# Patient Record
Sex: MALE | Race: WHITE | NOT HISPANIC OR LATINO | Employment: FULL TIME | URBAN - METROPOLITAN AREA
[De-identification: names, ages, dates, MRNs, and addresses within clinical notes are randomized per-mention and may not be internally consistent; named-entity substitution may affect disease eponyms.]

---

## 2020-11-16 ENCOUNTER — OFFICE VISIT (OUTPATIENT)
Dept: FAMILY MEDICINE CLINIC | Facility: CLINIC | Age: 30
End: 2020-11-16

## 2020-11-16 VITALS
HEIGHT: 74 IN | RESPIRATION RATE: 16 BRPM | BODY MASS INDEX: 37.99 KG/M2 | SYSTOLIC BLOOD PRESSURE: 127 MMHG | TEMPERATURE: 96.9 F | HEART RATE: 70 BPM | OXYGEN SATURATION: 99 % | DIASTOLIC BLOOD PRESSURE: 82 MMHG | WEIGHT: 296 LBS

## 2020-11-16 DIAGNOSIS — Z00.00 ANNUAL PHYSICAL EXAM: Primary | ICD-10-CM

## 2020-11-16 DIAGNOSIS — E03.9 ACQUIRED HYPOTHYROIDISM: ICD-10-CM

## 2020-11-16 DIAGNOSIS — J45.990 MILD EXERCISE-INDUCED ASTHMA: ICD-10-CM

## 2020-11-16 DIAGNOSIS — J30.1 SEASONAL ALLERGIC RHINITIS DUE TO POLLEN: ICD-10-CM

## 2020-11-16 PROBLEM — E66.9 OBESITY (BMI 35.0-39.9 WITHOUT COMORBIDITY): Status: ACTIVE | Noted: 2019-06-04

## 2020-11-16 PROBLEM — E78.1 HYPERTRIGLYCERIDEMIA: Status: ACTIVE | Noted: 2019-06-04

## 2020-11-16 PROCEDURE — 99395 PREV VISIT EST AGE 18-39: CPT | Performed by: FAMILY MEDICINE

## 2020-11-16 RX ORDER — LEVOTHYROXINE SODIUM 0.1 MG/1
100 TABLET ORAL DAILY
Qty: 90 TABLET | Refills: 1 | Status: SHIPPED | OUTPATIENT
Start: 2020-11-16 | End: 2021-07-27 | Stop reason: SDUPTHER

## 2020-11-16 RX ORDER — ALBUTEROL SULFATE 90 UG/1
2 AEROSOL, METERED RESPIRATORY (INHALATION) EVERY 4 HOURS PRN
COMMUNITY

## 2020-11-16 RX ORDER — LEVOTHYROXINE SODIUM 0.1 MG/1
100 TABLET ORAL DAILY
COMMUNITY
Start: 2020-08-13 | End: 2020-11-16 | Stop reason: SDUPTHER

## 2020-11-16 RX ORDER — FLUTICASONE PROPIONATE 50 MCG
2 SPRAY, SUSPENSION (ML) NASAL DAILY
Qty: 15.8 ML | Refills: 11 | Status: SHIPPED | OUTPATIENT
Start: 2020-11-16

## 2020-11-16 RX ORDER — FLUTICASONE PROPIONATE 50 MCG
2 SPRAY, SUSPENSION (ML) NASAL DAILY
COMMUNITY
End: 2020-11-16 | Stop reason: SDUPTHER

## 2020-11-16 NOTE — PROGRESS NOTES
Subjective   León Martinez is a 30 y.o. male.     History of Present Illness     30-year-old male who is a new patient to me presents today for annual physical exam.    Hypothyroidism: Levothyroxine 100 mcg a day.  Due for labs today. Has been on this med since college.     Allergic Rhinitis: Getting allergy shots; can lead to allergy induced asthma; uses Albuterol inhaler maybe a couple of times every fall or spring    Flu vaccine: Up-to-date  Tdap: Up-to-date    Diet/exercise: Three days a week working out     Social history: Social alcohol use no drug use former rare social smoker during college    Sexual history: Sexually active with wife.  No concern for STD testing today.    Wears contact lenses; vision checked once a year.    Family history of brain cancer in grandfather. No colon cancer/breast cancer.     PHQ-2/PHQ-9 Depression Screening 11/16/2020   Little interest or pleasure in doing things 0   Feeling down, depressed, or hopeless 0   Total Score 0       The following portions of the patient's history were reviewed and updated as appropriate: allergies, current medications, past family history, past medical history, past social history, past surgical history and problem list.    Review of Systems   Constitutional: Negative for activity change, appetite change, chills and fever.   HENT: Negative for congestion, ear pain, sinus pressure and sore throat.    Respiratory: Negative for cough and shortness of breath.    Cardiovascular: Negative for chest pain.   Gastrointestinal: Negative for abdominal pain, blood in stool, constipation and diarrhea.   Musculoskeletal: Negative for back pain.   Psychiatric/Behavioral: Negative for stress. The patient is not nervous/anxious.        Objective   Physical Exam  Constitutional:       Appearance: He is well-developed.   HENT:      Head: Normocephalic and atraumatic.      Right Ear: External ear normal.      Left Ear: External ear normal.      Nose: Nose normal.   Eyes:       Conjunctiva/sclera: Conjunctivae normal.      Pupils: Pupils are equal, round, and reactive to light.   Neck:      Musculoskeletal: Normal range of motion and neck supple.   Cardiovascular:      Rate and Rhythm: Normal rate and regular rhythm.      Heart sounds: No murmur.   Pulmonary:      Effort: Pulmonary effort is normal. No respiratory distress.      Breath sounds: Normal breath sounds. No stridor. No wheezing or rales.   Chest:      Chest wall: No tenderness.   Abdominal:      General: Bowel sounds are normal. There is no distension.      Palpations: Abdomen is soft.      Tenderness: There is no abdominal tenderness.   Musculoskeletal: Normal range of motion.   Skin:     General: Skin is warm.   Neurological:      Mental Status: He is alert and oriented to person, place, and time.   Psychiatric:         Behavior: Behavior normal.                 Assessment/Plan     Diagnoses and all orders for this visit:    1. Annual physical exam (Primary)  -     CBC & Differential  -     Comprehensive Metabolic Panel  -     Hemoglobin A1c  -     Lipid Panel  -     TSH Rfx On Abnormal To Free T4  -     Hepatitis C Antibody    2. Acquired hypothyroidism  -     levothyroxine (SYNTHROID, LEVOTHROID) 100 MCG tablet; Take 1 tablet by mouth Daily.  Dispense: 90 tablet; Refill: 1    3. Mild exercise-induced asthma    4. Seasonal allergic rhinitis due to pollen  -     fluticasone (FLONASE) 50 MCG/ACT nasal spray; 2 sprays into the nostril(s) as directed by provider Daily.  Dispense: 15.8 mL; Refill: 11      Low glycemic index diet  Exercise 30 minutes most days of the week  Make sure you get results on any labs or tests we ordered today  We discussed medications and how to take them as prescribed  Sleep 6-8 hours each night if possible  If you have not signed up for [employer]URN[employer], please activate your code ASAP from your After Visit Summary today    LDL goal <100  LDL goal if heart disease <70  HDL goal >60  Triglyceride goal  <150  BP goal =<130/80  Fasting glucose <100

## 2020-11-18 LAB
ALBUMIN SERPL-MCNC: 4.9 G/DL (ref 4.1–5.2)
ALBUMIN/GLOB SERPL: 2 {RATIO} (ref 1.2–2.2)
ALP SERPL-CCNC: 42 IU/L (ref 39–117)
ALT SERPL-CCNC: 28 IU/L (ref 0–44)
AST SERPL-CCNC: 23 IU/L (ref 0–40)
BASOPHILS # BLD AUTO: 0 X10E3/UL (ref 0–0.2)
BASOPHILS NFR BLD AUTO: 1 %
BILIRUB SERPL-MCNC: 0.5 MG/DL (ref 0–1.2)
BUN SERPL-MCNC: 14 MG/DL (ref 6–20)
BUN/CREAT SERPL: 14 (ref 9–20)
CALCIUM SERPL-MCNC: 9.5 MG/DL (ref 8.7–10.2)
CHLORIDE SERPL-SCNC: 102 MMOL/L (ref 96–106)
CHOLEST SERPL-MCNC: 175 MG/DL (ref 100–199)
CO2 SERPL-SCNC: 22 MMOL/L (ref 20–29)
CREAT SERPL-MCNC: 1.03 MG/DL (ref 0.76–1.27)
EOSINOPHIL # BLD AUTO: 0.1 X10E3/UL (ref 0–0.4)
EOSINOPHIL NFR BLD AUTO: 2 %
ERYTHROCYTE [DISTWIDTH] IN BLOOD BY AUTOMATED COUNT: 12.7 % (ref 11.6–15.4)
GLOBULIN SER CALC-MCNC: 2.5 G/DL (ref 1.5–4.5)
GLUCOSE SERPL-MCNC: 92 MG/DL (ref 65–99)
HBA1C MFR BLD: 5.2 % (ref 4.8–5.6)
HCT VFR BLD AUTO: 45.6 % (ref 37.5–51)
HCV AB S/CO SERPL IA: <0.1 S/CO RATIO (ref 0–0.9)
HDLC SERPL-MCNC: 33 MG/DL
HGB BLD-MCNC: 15.9 G/DL (ref 13–17.7)
IMM GRANULOCYTES # BLD AUTO: 0 X10E3/UL (ref 0–0.1)
IMM GRANULOCYTES NFR BLD AUTO: 1 %
LDLC SERPL CALC-MCNC: 79 MG/DL (ref 0–99)
LYMPHOCYTES # BLD AUTO: 1.6 X10E3/UL (ref 0.7–3.1)
LYMPHOCYTES NFR BLD AUTO: 33 %
MCH RBC QN AUTO: 31.1 PG (ref 26.6–33)
MCHC RBC AUTO-ENTMCNC: 34.9 G/DL (ref 31.5–35.7)
MCV RBC AUTO: 89 FL (ref 79–97)
MONOCYTES # BLD AUTO: 0.4 X10E3/UL (ref 0.1–0.9)
MONOCYTES NFR BLD AUTO: 9 %
NEUTROPHILS # BLD AUTO: 2.6 X10E3/UL (ref 1.4–7)
NEUTROPHILS NFR BLD AUTO: 54 %
PLATELET # BLD AUTO: 159 X10E3/UL (ref 150–450)
POTASSIUM SERPL-SCNC: 4.5 MMOL/L (ref 3.5–5.2)
PROT SERPL-MCNC: 7.4 G/DL (ref 6–8.5)
RBC # BLD AUTO: 5.12 X10E6/UL (ref 4.14–5.8)
SODIUM SERPL-SCNC: 140 MMOL/L (ref 134–144)
TRIGL SERPL-MCNC: 390 MG/DL (ref 0–149)
TSH SERPL DL<=0.005 MIU/L-ACNC: 0.99 UIU/ML (ref 0.45–4.5)
VLDLC SERPL CALC-MCNC: 63 MG/DL (ref 5–40)
WBC # BLD AUTO: 4.8 X10E3/UL (ref 3.4–10.8)

## 2020-11-20 NOTE — PROGRESS NOTES
All labs essentially normal except for elevations in triglycerides.  HDL could also be higher.  Exercise will help with this.  Work on diet and exercise to help with both levels.

## 2021-04-16 ENCOUNTER — BULK ORDERING (OUTPATIENT)
Dept: CASE MANAGEMENT | Facility: OTHER | Age: 31
End: 2021-04-16

## 2021-04-16 DIAGNOSIS — Z23 IMMUNIZATION DUE: ICD-10-CM

## 2021-07-27 ENCOUNTER — OFFICE VISIT (OUTPATIENT)
Dept: FAMILY MEDICINE CLINIC | Facility: CLINIC | Age: 31
End: 2021-07-27

## 2021-07-27 VITALS
OXYGEN SATURATION: 98 % | WEIGHT: 300 LBS | TEMPERATURE: 97.1 F | DIASTOLIC BLOOD PRESSURE: 84 MMHG | HEART RATE: 74 BPM | RESPIRATION RATE: 18 BRPM | HEIGHT: 74 IN | BODY MASS INDEX: 38.5 KG/M2 | SYSTOLIC BLOOD PRESSURE: 133 MMHG

## 2021-07-27 DIAGNOSIS — G56.02 CARPAL TUNNEL SYNDROME OF LEFT WRIST: Primary | ICD-10-CM

## 2021-07-27 DIAGNOSIS — L30.9 DERMATITIS: ICD-10-CM

## 2021-07-27 DIAGNOSIS — E03.9 ACQUIRED HYPOTHYROIDISM: ICD-10-CM

## 2021-07-27 PROCEDURE — 99214 OFFICE O/P EST MOD 30 MIN: CPT | Performed by: FAMILY MEDICINE

## 2021-07-27 RX ORDER — TRIAMCINOLONE ACETONIDE 1 MG/G
CREAM TOPICAL 2 TIMES DAILY
Qty: 15 G | Refills: 0 | Status: SHIPPED | OUTPATIENT
Start: 2021-07-27

## 2021-07-27 RX ORDER — LEVOTHYROXINE SODIUM 0.1 MG/1
100 TABLET ORAL DAILY
Qty: 90 TABLET | Refills: 1 | Status: SHIPPED | OUTPATIENT
Start: 2021-07-27 | End: 2021-12-27 | Stop reason: SDUPTHER

## 2021-07-27 NOTE — PROGRESS NOTES
Subjective   León Martinez is a 31 y.o. male.     History of Present Illness     31-year-old male presents today for 6-month follow-up on hypothyroidism.    Levothyroxine 100 mcg a day.  Feels like controlling symptoms well.  No unexplained fatigue no dry skin or dry hair.    Dry rash left lower extremity several years.  Working on moisturizing more.    Splinter right thumb times a few days; no signs of infection; slightly tender to palpation; working with hot compresses.    Numbness and tingling left fourth and fifth digit; family history of carpal tunnel syndrome; no shooting pain down his arm.  No history of arthritic changes in neck.  Was on vacation for a few days away from work not on computer and symptoms were not bothering him.    The following portions of the patient's history were reviewed and updated as appropriate: allergies, current medications, past family history, past medical history, past social history, past surgical history and problem list.    Review of Systems   Constitutional: Negative for chills and fever.   Respiratory: Negative for cough and shortness of breath.    Cardiovascular: Negative for chest pain, palpitations and leg swelling.   Gastrointestinal: Negative for abdominal pain, nausea and vomiting.       Objective   Physical Exam  Constitutional:       Appearance: He is well-developed.   HENT:      Head: Normocephalic and atraumatic.   Eyes:      Pupils: Pupils are equal, round, and reactive to light.   Cardiovascular:      Rate and Rhythm: Normal rate and regular rhythm.      Heart sounds: Normal heart sounds. No murmur heard.     Pulmonary:      Effort: Pulmonary effort is normal. No respiratory distress.      Breath sounds: Normal breath sounds. No stridor. No wheezing or rales.   Musculoskeletal:      Cervical back: Normal range of motion and neck supple.   Skin:     General: Skin is warm.   Neurological:      Mental Status: He is alert and oriented to person, place, and time.    Psychiatric:         Behavior: Behavior normal.                 Assessment/Plan     Diagnoses and all orders for this visit:    1. Carpal tunnel syndrome of left wrist (Primary)    2. Acquired hypothyroidism  -     levothyroxine (SYNTHROID, LEVOTHROID) 100 MCG tablet; Take 1 tablet by mouth Daily.  Dispense: 90 tablet; Refill: 1    3. Dermatitis  -     triamcinolone (KENALOG) 0.1 % cream; Apply  topically to the appropriate area as directed 2 (Two) Times a Day.  Dispense: 15 g; Refill: 0    Stiff wrist splint for carpal tunnel of left wrist.  Continue levothyroxine as dosed.  Triamcinolone cream as noted above.  Also advised to moisturize daily with Aquaphor.  If no change can consider referral to dermatology.  Continue hot compresses for splinter.

## 2021-08-31 ENCOUNTER — TELEPHONE (OUTPATIENT)
Dept: FAMILY MEDICINE CLINIC | Facility: CLINIC | Age: 31
End: 2021-08-31

## 2021-08-31 ENCOUNTER — TELEMEDICINE (OUTPATIENT)
Dept: FAMILY MEDICINE CLINIC | Facility: CLINIC | Age: 31
End: 2021-08-31

## 2021-08-31 VITALS — HEIGHT: 74 IN | BODY MASS INDEX: 38.5 KG/M2 | WEIGHT: 300 LBS

## 2021-08-31 DIAGNOSIS — R05.9 COUGH: ICD-10-CM

## 2021-08-31 DIAGNOSIS — J22 ACUTE RESPIRATORY INFECTION: Primary | ICD-10-CM

## 2021-08-31 DIAGNOSIS — K30 INDIGESTION: ICD-10-CM

## 2021-08-31 DIAGNOSIS — J02.9 SORE THROAT: ICD-10-CM

## 2021-08-31 DIAGNOSIS — R52 BODY ACHES: ICD-10-CM

## 2021-08-31 PROCEDURE — 99213 OFFICE O/P EST LOW 20 MIN: CPT | Performed by: FAMILY MEDICINE

## 2021-08-31 RX ORDER — AMOXICILLIN AND CLAVULANATE POTASSIUM 875; 125 MG/1; MG/1
1 TABLET, FILM COATED ORAL 2 TIMES DAILY
Qty: 14 TABLET | Refills: 0 | Status: SHIPPED | OUTPATIENT
Start: 2021-08-31 | End: 2021-09-07

## 2021-08-31 RX ORDER — BROMPHENIRAMINE MALEATE, PSEUDOEPHEDRINE HYDROCHLORIDE, AND DEXTROMETHORPHAN HYDROBROMIDE 2; 30; 10 MG/5ML; MG/5ML; MG/5ML
5 SYRUP ORAL 4 TIMES DAILY PRN
Qty: 118 ML | Refills: 0 | Status: SHIPPED | OUTPATIENT
Start: 2021-08-31 | End: 2022-05-11

## 2021-08-31 NOTE — PROGRESS NOTES
Subjective   León Martinez is a 31 y.o. male.     History of Present Illness     León Martinez 31 y.o. male who presents today via video visit for evaluation of sinus pressure and congestion, sore throat, cough, wheezing. Symptoms include ear pressure, congestion, nasal blockage, dry cough, myalgias and fatigue.  Onset of symptoms was a few days ago, gradually worsening since that time. Patient denies shortness of breath, fever, diarrhea, chills, sweats, loss of taste/smell.   Evaluation to date: none Treatment to date:  none.     Of note patient is vaccinated against COVID-19.        The following portions of the patient's history were reviewed and updated as appropriate: allergies, current medications, past family history, past medical history, past social history, past surgical history and problem list.    Review of Systems   Constitutional: Negative for chills and fever.   HENT: Positive for congestion and sinus pressure. Negative for ear discharge and ear pain.    Respiratory: Positive for cough. Negative for shortness of breath.        Objective   Physical Exam  Constitutional:       Appearance: He is well-developed.   HENT:      Head: Normocephalic and atraumatic.      Right Ear: External ear normal.      Left Ear: External ear normal.   Pulmonary:      Effort: Pulmonary effort is normal. No respiratory distress.   Neurological:      Mental Status: He is alert and oriented to person, place, and time.   Psychiatric:         Behavior: Behavior normal.                 Assessment/Plan     Diagnoses and all orders for this visit:    1. Acute respiratory infection (Primary)  -     COVID-19,[facility] ROUTINE, NP/OP SWAB IN TRANSPORT MEDIA OR ESWAB 72 HR TAT - Swab, Anterior nasal  -     amoxicillin-clavulanate (Augmentin) 875-125 MG per tablet; Take 1 tablet by mouth 2 (Two) Times a Day for 7 days.  Dispense: 14 tablet; Refill: 0  -     brompheniramine-pseudoephedrine-DM 30-2-10 MG/5ML syrup; Take 5 mL by mouth 4 (Four)  Times a Day As Needed for Cough.  Dispense: 118 mL; Refill: 0    2. Cough  -     COVID-19,[facility] ROUTINE, NP/OP SWAB IN TRANSPORT MEDIA OR ESWAB 72 HR TAT - Swab, Anterior nasal  -     amoxicillin-clavulanate (Augmentin) 875-125 MG per tablet; Take 1 tablet by mouth 2 (Two) Times a Day for 7 days.  Dispense: 14 tablet; Refill: 0  -     brompheniramine-pseudoephedrine-DM 30-2-10 MG/5ML syrup; Take 5 mL by mouth 4 (Four) Times a Day As Needed for Cough.  Dispense: 118 mL; Refill: 0    3. Sore throat  -     COVID-19,[facility] ROUTINE, NP/OP SWAB IN TRANSPORT MEDIA OR ESWAB 72 HR TAT - Swab, Anterior nasal  -     amoxicillin-clavulanate (Augmentin) 875-125 MG per tablet; Take 1 tablet by mouth 2 (Two) Times a Day for 7 days.  Dispense: 14 tablet; Refill: 0  -     brompheniramine-pseudoephedrine-DM 30-2-10 MG/5ML syrup; Take 5 mL by mouth 4 (Four) Times a Day As Needed for Cough.  Dispense: 118 mL; Refill: 0    4. Body aches  -     COVID-19,[facility] ROUTINE, NP/OP SWAB IN TRANSPORT MEDIA OR ESWAB 72 HR TAT - Swab, Anterior nasal  -     amoxicillin-clavulanate (Augmentin) 875-125 MG per tablet; Take 1 tablet by mouth 2 (Two) Times a Day for 7 days.  Dispense: 14 tablet; Refill: 0  -     brompheniramine-pseudoephedrine-DM 30-2-10 MG/5ML syrup; Take 5 mL by mouth 4 (Four) Times a Day As Needed for Cough.  Dispense: 118 mL; Refill: 0    5. Indigestion  -     COVID-19,[facility] ROUTINE, NP/OP SWAB IN TRANSPORT MEDIA OR ESWAB 72 HR TAT - Swab, Anterior nasal  -     amoxicillin-clavulanate (Augmentin) 875-125 MG per tablet; Take 1 tablet by mouth 2 (Two) Times a Day for 7 days.  Dispense: 14 tablet; Refill: 0  -     brompheniramine-pseudoephedrine-DM 30-2-10 MG/5ML syrup; Take 5 mL by mouth 4 (Four) Times a Day As Needed for Cough.  Dispense: 118 mL; Refill: 0      Time spent during visit: 15 minutes.    Suspected COVID infection due to recent symptoms.  Signs and symptoms discussed with patient.  Patient educated to self isolate  in her room away from others they live with.  Mask available.  Patient advised not to leave house for any reason.    Self treatment discussed including Tylenol for fever, pain or myalgias; cough cold medications for symptoms.  Patient to check temperature daily.  Monitor for symptoms of respiratory distress.  To check in daily with office via my chart or phone with symptoms.    Nature disease to cause severe respiratory distress day 8 or 9 discussed.  If needs emergent care to notify EMS or ED or our office that they may have coded to allow for proper PPE and isolation.

## 2021-08-31 NOTE — TELEPHONE ENCOUNTER
Caller: León Martinez    Relationship: Self    Best call back number: [phone]    What orders are you requesting (i.e. lab or imaging): COVID     In what timeframe would the patient need to come in: ASAP     Where will you receive your lab/imaging services: IN OFFICE     Additional notes: PATIENT IS HAVING A COUGH, HEADACHE, SORE THROAT AND NAUSEA. PATIENT STATES HE WAS TEXTING DR OLIVEROS LAST NIGHT 08/30 AND WAS INFORMED BY HER TO CALL AND REQUEST FOR A COVID TEST BE ORDERED

## 2021-09-01 LAB — SARS-COV-2 RNA RESP QL NAA+PROBE: NOT DETECTED

## 2021-12-23 DIAGNOSIS — E03.9 ACQUIRED HYPOTHYROIDISM: ICD-10-CM

## 2021-12-27 DIAGNOSIS — E03.9 ACQUIRED HYPOTHYROIDISM: ICD-10-CM

## 2021-12-27 RX ORDER — LEVOTHYROXINE SODIUM 0.1 MG/1
100 TABLET ORAL DAILY
Qty: 30 TABLET | Refills: 0 | Status: SHIPPED | OUTPATIENT
Start: 2021-12-27 | End: 2021-12-27

## 2021-12-27 RX ORDER — LEVOTHYROXINE SODIUM 0.1 MG/1
TABLET ORAL
Qty: 30 TABLET | Refills: 0 | Status: SHIPPED | OUTPATIENT
Start: 2021-12-27 | End: 2022-04-28

## 2021-12-27 RX ORDER — LEVOTHYROXINE SODIUM 0.1 MG/1
100 TABLET ORAL DAILY
Qty: 90 TABLET | Refills: 1 | OUTPATIENT
Start: 2021-12-27

## 2022-01-25 ENCOUNTER — DOCUMENTATION (OUTPATIENT)
Dept: SLEEP MEDICINE | Facility: HOSPITAL | Age: 32
End: 2022-01-25

## 2022-01-25 DIAGNOSIS — J01.00 ACUTE MAXILLARY SINUSITIS, RECURRENCE NOT SPECIFIED: Primary | ICD-10-CM

## 2022-01-25 RX ORDER — AMOXICILLIN AND CLAVULANATE POTASSIUM 875; 125 MG/1; MG/1
1 TABLET, FILM COATED ORAL 2 TIMES DAILY
Qty: 14 TABLET | Refills: 0 | Status: SHIPPED | OUTPATIENT
Start: 2022-01-25 | End: 2022-02-01

## 2022-01-25 NOTE — PROGRESS NOTES
Patient reports productive cough sore throat intermittent headache and fatigue. Denies shortness of breath. Denies fevers. Cough productive of green/yellow sputum. Reports associated congestion. Sore throat headache and fatigue, currently stable same with maybe slight improvement. Has taken over-the-counter Mucinex. At home Covid test negative x2. Denies loss of taste or smell. Denies diarrhea/vomiting.    History of frequent sinus infections. History obstructive sleep apnea on CPAP; has been able to tolerate CPAP.    On exam lungs clear to auscultation bilaterally with no rhonchi rales or wheezes. Mild tenderness with superficial cervical lymphadenopathy. Afebrile.    Advised Augmentin 875-125 mg twice daily x7 days. Any new or worsening symptoms especially shortness of breath or uncontrollable fever go to urgent care or ER. Continue with Mucinex as needed and continue to stay hydrated. Has Bromfed cough syrup; advised to use 4 times daily as needed for cough.    Patient expressed understanding agreeable to plan.

## 2022-04-03 DIAGNOSIS — E03.9 ACQUIRED HYPOTHYROIDISM: ICD-10-CM

## 2022-04-04 RX ORDER — LEVOTHYROXINE SODIUM 0.1 MG/1
100 TABLET ORAL DAILY
Qty: 30 TABLET | Refills: 0 | OUTPATIENT
Start: 2022-04-04

## 2022-04-28 DIAGNOSIS — E03.9 ACQUIRED HYPOTHYROIDISM: ICD-10-CM

## 2022-04-28 RX ORDER — LEVOTHYROXINE SODIUM 0.1 MG/1
TABLET ORAL
Qty: 14 TABLET | Refills: 0 | Status: SHIPPED | OUTPATIENT
Start: 2022-04-28 | End: 2022-05-11 | Stop reason: SDUPTHER

## 2022-05-11 ENCOUNTER — OFFICE VISIT (OUTPATIENT)
Dept: FAMILY MEDICINE CLINIC | Facility: CLINIC | Age: 32
End: 2022-05-11

## 2022-05-11 VITALS
HEART RATE: 87 BPM | SYSTOLIC BLOOD PRESSURE: 136 MMHG | DIASTOLIC BLOOD PRESSURE: 90 MMHG | RESPIRATION RATE: 18 BRPM | BODY MASS INDEX: 38.52 KG/M2 | WEIGHT: 300 LBS | OXYGEN SATURATION: 97 %

## 2022-05-11 DIAGNOSIS — E66.09 CLASS 2 OBESITY DUE TO EXCESS CALORIES WITHOUT SERIOUS COMORBIDITY WITH BODY MASS INDEX (BMI) OF 38.0 TO 38.9 IN ADULT: ICD-10-CM

## 2022-05-11 DIAGNOSIS — E78.1 HYPERTRIGLYCERIDEMIA: ICD-10-CM

## 2022-05-11 DIAGNOSIS — Z00.00 ROUTINE HEALTH MAINTENANCE: Primary | ICD-10-CM

## 2022-05-11 DIAGNOSIS — Z13.1 SCREENING FOR DIABETES MELLITUS: ICD-10-CM

## 2022-05-11 DIAGNOSIS — E03.9 ACQUIRED HYPOTHYROIDISM: ICD-10-CM

## 2022-05-11 PROBLEM — E66.812 CLASS 2 OBESITY DUE TO EXCESS CALORIES WITHOUT SERIOUS COMORBIDITY WITH BODY MASS INDEX (BMI) OF 38.0 TO 38.9 IN ADULT: Status: ACTIVE | Noted: 2019-06-04

## 2022-05-11 PROCEDURE — 99395 PREV VISIT EST AGE 18-39: CPT | Performed by: FAMILY MEDICINE

## 2022-05-11 PROCEDURE — 99213 OFFICE O/P EST LOW 20 MIN: CPT | Performed by: FAMILY MEDICINE

## 2022-05-11 RX ORDER — LEVOTHYROXINE SODIUM 0.1 MG/1
100 TABLET ORAL DAILY
Qty: 90 TABLET | Refills: 3 | Status: SHIPPED | OUTPATIENT
Start: 2022-05-11

## 2022-05-11 NOTE — PROGRESS NOTES
"Chief Complaint  Establish Care and Med Management    Subjective    History of Present Illness {CC  Problem List  Visit  Diagnosis   Encounters  Notes  Medications  Labs  Result Review Imaging  Media :23}     León Martinez presents to [facility] for Establish Care and Med Management.  History of Present Illness     Here today to establish care and for annual exam. Overall feels that he is doing fairly well. Working on improving diet and exercise. Has a strong family history of hypertension, hoping to avoid medications if possible. Does have some high triglycerides, due for check of labs today.    Has a history of hypothyroidism for which she is on levothyroxine 100 mcg. Reports good adherence and tolerance. No recent signs or symptoms of high or low thyroid function. Hoping for refill today.    Not currently working, \"trying to get things figured out.\" Was previously in business strategy. Wife is pregnant, due in July.    Has good family and social support. Gets regular dental checks.    Objective     Vital Signs:   /90   Pulse 87   Resp 18   Wt 136 kg (300 lb)   SpO2 97%   BMI 38.52 kg/m²   Physical Exam  Vitals and nursing note reviewed.   Constitutional:       General: He is not in acute distress.     Appearance: Normal appearance. He is not ill-appearing.   Cardiovascular:      Rate and Rhythm: Normal rate and regular rhythm.      Pulses: Normal pulses.      Heart sounds: Normal heart sounds. No murmur heard.  Pulmonary:      Effort: Pulmonary effort is normal. No respiratory distress.      Breath sounds: Normal breath sounds. No rales.   Neurological:      Mental Status: He is alert and oriented to person, place, and time. Mental status is at baseline.   Psychiatric:         Mood and Affect: Mood normal.         Behavior: Behavior normal.          Result Review  Data Reviewed:{ Labs  Result Review  Imaging  Med Tab  Media :23}                   Assessment " and Plan {CC Problem List  Visit Diagnosis  ROS  Review (Popup)  Health Maintenance  Quality  BestPractice  Medications  SmartSets  SnapShot Encounters  Media :23}   Diagnoses and all orders for this visit:    1. Routine health maintenance (Primary)    2. Acquired hypothyroidism  -     levothyroxine (SYNTHROID, LEVOTHROID) 100 MCG tablet; Take 1 tablet by mouth Daily.  Dispense: 90 tablet; Refill: 3  -     TSH Rfx On Abnormal To Free T4    3. Hypertriglyceridemia  -     Comprehensive Metabolic Panel  -     Lipid Panel    4. Class 2 obesity due to excess calories without serious comorbidity with body mass index (BMI) of 38.0 to 38.9 in adult    5. Screening for diabetes mellitus  -     Comprehensive Metabolic Panel    Orders as above. I will contact him with lab results as available. Continue levothyroxine as prescribed.    Encouraged him to continue working on lifestyle improvements.    Recommended follow-up as below. Encouraged communication via [employer] in the meantime.    Patient was given instructions and counseling regarding his condition or for health maintenance advice. Please see specific information pulled into the AVS (placed there by myself) if appropriate.    Return in about 1 year (around 5/11/2023), or if symptoms worsen or fail to improve, for Preventive Health Maintenance.      SILVIA Carlson MD

## 2022-05-12 LAB
ALBUMIN SERPL-MCNC: 5.1 G/DL (ref 4–5)
ALBUMIN/GLOB SERPL: 2.1 {RATIO} (ref 1.2–2.2)
ALP SERPL-CCNC: 44 IU/L (ref 44–121)
ALT SERPL-CCNC: 48 IU/L (ref 0–44)
AST SERPL-CCNC: 34 IU/L (ref 0–40)
BILIRUB SERPL-MCNC: 0.8 MG/DL (ref 0–1.2)
BUN SERPL-MCNC: 15 MG/DL (ref 6–20)
BUN/CREAT SERPL: 14 (ref 9–20)
CALCIUM SERPL-MCNC: 9.8 MG/DL (ref 8.7–10.2)
CHLORIDE SERPL-SCNC: 100 MMOL/L (ref 96–106)
CHOLEST SERPL-MCNC: 207 MG/DL (ref 100–199)
CO2 SERPL-SCNC: 22 MMOL/L (ref 20–29)
CREAT SERPL-MCNC: 1.09 MG/DL (ref 0.76–1.27)
EGFRCR SERPLBLD CKD-EPI 2021: 93 ML/MIN/1.73
GLOBULIN SER CALC-MCNC: 2.4 G/DL (ref 1.5–4.5)
GLUCOSE SERPL-MCNC: 93 MG/DL (ref 65–99)
HDLC SERPL-MCNC: 35 MG/DL
LDLC SERPL CALC-MCNC: 114 MG/DL (ref 0–99)
POTASSIUM SERPL-SCNC: 4.7 MMOL/L (ref 3.5–5.2)
PROT SERPL-MCNC: 7.5 G/DL (ref 6–8.5)
SODIUM SERPL-SCNC: 138 MMOL/L (ref 134–144)
TRIGL SERPL-MCNC: 336 MG/DL (ref 0–149)
TSH SERPL DL<=0.005 MIU/L-ACNC: 1.77 UIU/ML (ref 0.45–4.5)
VLDLC SERPL CALC-MCNC: 58 MG/DL (ref 5–40)

## 2022-05-12 RX ORDER — ATORVASTATIN CALCIUM 20 MG/1
20 TABLET, FILM COATED ORAL DAILY
Qty: 90 TABLET | Refills: 1 | Status: SHIPPED | OUTPATIENT
Start: 2022-05-12 | End: 2022-11-04

## 2022-05-13 ENCOUNTER — PATIENT ROUNDING (BHMG ONLY) (OUTPATIENT)
Dept: FAMILY MEDICINE CLINIC | Facility: CLINIC | Age: 32
End: 2022-05-13

## 2022-05-13 NOTE — PROGRESS NOTES
May 13, 2022    Hello, may I speak with León Martinez?    My name is Mae     I am  with [facility]  [address]  [facility] [address] [phone].    Before we get started may I verify your date of birth? 1990    I am calling to officially welcome you to our practice and ask about your recent visit. Is this a good time to talk? yes    Tell me about your visit with us. What things went well? Patient stated that the visit went really well, and that everyone was very friendly.         We're always looking for ways to make our patients' experiences even better. Do you have recommendations on ways we may improve?  no    Overall were you satisfied with your first visit to our practice? yes       I appreciate you taking the time to speak with me today. Is there anything else I can do for you? No- patient stated that he already followed up with provider and received response via [employer].       Thank you, and have a great day.

## 2022-09-09 ENCOUNTER — OFFICE VISIT (OUTPATIENT)
Dept: FAMILY MEDICINE CLINIC | Facility: CLINIC | Age: 32
End: 2022-09-09

## 2022-09-09 VITALS
SYSTOLIC BLOOD PRESSURE: 122 MMHG | BODY MASS INDEX: 38.83 KG/M2 | HEIGHT: 73 IN | HEART RATE: 82 BPM | WEIGHT: 293 LBS | OXYGEN SATURATION: 96 % | DIASTOLIC BLOOD PRESSURE: 80 MMHG

## 2022-09-09 DIAGNOSIS — E78.2 MIXED HYPERLIPIDEMIA: ICD-10-CM

## 2022-09-09 DIAGNOSIS — Z48.02 VISIT FOR SUTURE REMOVAL: Primary | ICD-10-CM

## 2022-09-09 PROCEDURE — 99214 OFFICE O/P EST MOD 30 MIN: CPT | Performed by: NURSE PRACTITIONER

## 2022-09-09 NOTE — PROGRESS NOTES
Subjective   León Martinez is a 32 y.o. male.     History of Present Illness   Patient presents for follow-up for recent laceration to left thumb with stitches. He reports that he was cutting vegetables and accidentally sliced his left thumb. Patient was seen at [facility] and received two stiches. He is here today to have these removed. Stitches were placed 5 days ago.    Patient presents for follow-up for hyperlipidemia, ongoing. He was started on Atorvastatin earlier in the year and reports that he's not been in to get his repeat labs. He is taking his medication daily and denies any adverse side affects. He does not adhere to heart healthy diet. Patient denies any chest pain, shortness of breath, dizziness, palpitations or headache.     The following portions of the patient's history were reviewed and updated as appropriate: allergies, current medications, past family history, past medical history, past social history, past surgical history and problem list.    Review of Systems   Constitutional: Negative for chills, fatigue and fever.   Respiratory: Negative for cough, chest tightness, shortness of breath and wheezing.    Cardiovascular: Negative for chest pain, palpitations and leg swelling.   Skin:        2 stiches in left thumb   Neurological: Negative for dizziness and headache.   Hematological: Negative.    Psychiatric/Behavioral: Negative.  Negative for sleep disturbance.       Objective   Physical Exam  Vitals and nursing note reviewed.   Constitutional:       Appearance: Normal appearance. He is well-developed. He is obese.   HENT:      Head: Normocephalic and atraumatic.   Eyes:      Conjunctiva/sclera: Conjunctivae normal.      Pupils: Pupils are equal, round, and reactive to light.   Cardiovascular:      Rate and Rhythm: Normal rate and regular rhythm.      Heart sounds: Normal heart sounds. No murmur heard.  Pulmonary:      Effort: Pulmonary effort is normal.      Breath sounds: Normal breath sounds.    Skin:     General: Skin is warm.      Comments: Stitches to left thumb, site intact, no signs or symptoms of infection note.    Neurological:      Mental Status: He is alert and oriented to person, place, and time.   Psychiatric:         Behavior: Behavior normal.         Thought Content: Thought content normal.         Judgment: Judgment normal.         Vitals:    09/09/22 1107   BP: 122/80   Pulse: 82   SpO2: 96%     Body mass index is 38.66 kg/m².    Suture Removal    Date/Time: 9/9/2022 11:14 AM  Performed by: Iva Crain APRN  Authorized by: Iva Crain APRN   Consent: Verbal consent obtained.  Consent given by: patient  Body area: upper extremity  Location details: left thumb  Wound Appearance: clean  Sutures Removed: 2  Patient tolerance: patient tolerated the procedure well with no immediate complications          Assessment & Plan   Problems Addressed this Visit    None     Visit Diagnoses     Visit for suture removal    -  Primary    Mixed hyperlipidemia        Relevant Orders    Lipid Panel With / Chol / HDL Ratio    Comprehensive Metabolic Panel      Diagnoses       Codes Comments    Visit for suture removal    -  Primary ICD-10-CM: Z48.02  ICD-9-CM: V58.32     Mixed hyperlipidemia     ICD-10-CM: E78.2  ICD-9-CM: 272.2         CMP  Lipid panel  Suture removal          Return if symptoms worsen or fail to improve.

## 2022-09-10 LAB
ALBUMIN SERPL-MCNC: 5.1 G/DL (ref 3.5–5.2)
ALBUMIN/GLOB SERPL: 2.7 G/DL
ALP SERPL-CCNC: 43 U/L (ref 39–117)
ALT SERPL-CCNC: 38 U/L (ref 1–41)
AST SERPL-CCNC: 20 U/L (ref 1–40)
BILIRUB SERPL-MCNC: 0.6 MG/DL (ref 0–1.2)
BUN SERPL-MCNC: 13 MG/DL (ref 6–20)
BUN/CREAT SERPL: 13.5 (ref 7–25)
CALCIUM SERPL-MCNC: 10 MG/DL (ref 8.6–10.5)
CHLORIDE SERPL-SCNC: 104 MMOL/L (ref 98–107)
CHOLEST SERPL-MCNC: 144 MG/DL (ref 0–200)
CHOLEST/HDLC SERPL: 3.79 {RATIO}
CO2 SERPL-SCNC: 23.6 MMOL/L (ref 22–29)
CREAT SERPL-MCNC: 0.96 MG/DL (ref 0.76–1.27)
EGFRCR-CYS SERPLBLD CKD-EPI 2021: 107.7 ML/MIN/1.73
GLOBULIN SER CALC-MCNC: 1.9 GM/DL
GLUCOSE SERPL-MCNC: 100 MG/DL (ref 65–99)
HDLC SERPL-MCNC: 38 MG/DL (ref 40–60)
LDLC SERPL CALC-MCNC: 65 MG/DL (ref 0–100)
POTASSIUM SERPL-SCNC: 4.1 MMOL/L (ref 3.5–5.2)
PROT SERPL-MCNC: 7 G/DL (ref 6–8.5)
SODIUM SERPL-SCNC: 141 MMOL/L (ref 136–145)
TRIGL SERPL-MCNC: 257 MG/DL (ref 0–150)
VLDLC SERPL CALC-MCNC: 41 MG/DL (ref 5–40)

## 2022-11-04 RX ORDER — ATORVASTATIN CALCIUM 20 MG/1
TABLET, FILM COATED ORAL
Qty: 90 TABLET | Refills: 1 | Status: SHIPPED | OUTPATIENT
Start: 2022-11-04

## 2022-11-30 ENCOUNTER — OFFICE VISIT (OUTPATIENT)
Dept: SLEEP MEDICINE | Facility: HOSPITAL | Age: 32
End: 2022-11-30

## 2022-11-30 VITALS — HEIGHT: 73 IN | WEIGHT: 310 LBS | HEART RATE: 90 BPM | OXYGEN SATURATION: 97 % | BODY MASS INDEX: 41.08 KG/M2

## 2022-11-30 DIAGNOSIS — G47.33 OBSTRUCTIVE SLEEP APNEA: Primary | ICD-10-CM

## 2022-11-30 DIAGNOSIS — E66.9 OBESITY (BMI 30-39.9): ICD-10-CM

## 2022-11-30 PROCEDURE — 99214 OFFICE O/P EST MOD 30 MIN: CPT | Performed by: FAMILY MEDICINE

## 2022-11-30 PROCEDURE — G0463 HOSPITAL OUTPT CLINIC VISIT: HCPCS

## 2022-11-30 NOTE — PROGRESS NOTES
[facility] New Patient/Consultation       Reason for Consultation: essence      Patient Care Team:  Timothy Carlson MD as PCP - General (Family Medicine)  Baron Cuevas MD as Consulting Physician (Sleep Medicine)      History of present illness:  Thank you for asking me to see your patient.  The patient is a 32 y.o. male With allergic rhinitis hypertriglyceridemia hypothyroidism mild exercise-induced asthma presents today to establish care for ESSENCE.  Patient is currently on PAP machine.  Reports sleep study around 2014.  No copy today.  Tonsillectomy 1995.  Patient reports with PAP machine symptoms of hypersomnia nonrestorative sleep are under control when supplies are up-to-date.  Reports morning headaches dry mouth sleep paralysis episodes in the past more sleepy when he increases sleep time without Pap machine.  Current machine is over 5 years old not working as well as it used to.  Patient is very much overdue for new machine.    Bedtime 10 PM to 11 PM sleep latency 1 hour wake time 7 AM to 8 AM sleep 7 to 9 hours 0 naps no rotating shifts.      Social History: No tobacco use 8 alcoholic drinks per week 4 caffeine beverages a day no drug use.    Allergies:  Naproxen sodium    Family History: ESSENCE yes       Current Outpatient Medications:   •  albuterol sulfate  (90 Base) MCG/ACT inhaler, Inhale 2 puffs Every 4 (Four) Hours As Needed for Wheezing., Disp: , Rfl:   •  atorvastatin (LIPITOR) 20 MG tablet, TAKE 1 TABLET BY MOUTH EVERY DAY, Disp: 90 tablet, Rfl: 1  •  Fexofenadine HCl (ALLEGRA PO), Take  by mouth., Disp: , Rfl:   •  fluticasone (FLONASE) 50 MCG/ACT nasal spray, 2 sprays into the nostril(s) as directed by provider Daily., Disp: 15.8 mL, Rfl: 11  •  levothyroxine (SYNTHROID, LEVOTHROID) 100 MCG tablet, Take 1 tablet by mouth Daily., Disp: 90 tablet, Rfl: 3  •  triamcinolone (KENALOG) 0.1 % cream, Apply  topically to the appropriate area as directed 2 (Two) Times a Day., Disp: 15 g,  "Rfl: 0    Vital Signs:    Vitals:    11/30/22 1044   Pulse: 90   SpO2: 97%   Weight: (!) 141 kg (310 lb)   Height: 185.4 cm (73\")      Body mass index is 40.9 kg/m².  Neck Circumference: 19.5 inches      REVIEW OF SYSTEMS.  Full review of systems available on the intake form which is scanned in the media tab.  The relevant positive are noted below  1. Daytime excessive sleepiness with [place] Sleepiness Scale :Total score: 7   2. Snoring  3. All negative      Physical exam:  Vitals:    11/30/22 1044   Pulse: 90   SpO2: 97%   Weight: (!) 141 kg (310 lb)   Height: 185.4 cm (73\")    Body mass index is 40.9 kg/m². Neck Circumference: 19.5 inches  HEENT: Head is atraumatic, normocephalic  Eyes: pupils are round equal and reacting to light and accommodation, conjunctiva normal  NECK:Neck Circumference: 19.5 inches  RESPIRATORY SYSTEM: Regular respirations  CARDIOVASULAR SYSTEM: Regular rate  EXTREMITES: No cyanosis, clubbing  NEUROLOGICAL SYSTEM: Oriented x 3, no gross motor defects, gait normal      Impression:  1. Obstructive sleep apnea    2. Obesity (BMI 30-39.9)        Plan:    Good sleep hygiene measures should be maintained.  Weight loss would be beneficial in this patient who is obese BMI 38.7.    I discussed the pathophysiology of obstructive sleep apnea with the patient.  We discussed the adverse outcomes associated with untreated sleep-disordered breathing.  We discussed treatment modalities of obstructive sleep apnea including CPAP device as well as oral mandibular advancement device. Sleep study will be scheduled to establish definitive diagnosis of sleep disorder breathing.  Weight loss will be strongly beneficial in order to reduce the severity of sleep-disordered breathing.  Patient has narrow oropharyngeal structure.  Caution during activities that require prolonged concentration is strongly advised.  Patient will be notified of sleep study results after sleep study is completed.      Reassess severity of " sleep apnea with home sleep study and place order for new PAP machine as patient's current machine is well over 5 years old and patient is due for new machine; return to clinic after at least 1 month use of CPAP for follow-up or sooner if needed..    Thank you for allowing me to participate in your patient's care.    Baron Cuevas MD  Sleep Medicine  11/30/22  10:57 EST

## 2022-12-20 ENCOUNTER — HOSPITAL ENCOUNTER (OUTPATIENT)
Dept: SLEEP MEDICINE | Facility: HOSPITAL | Age: 32
Discharge: HOME | End: 2022-12-20
Admitting: FAMILY MEDICINE
Payer: COMMERCIAL

## 2022-12-20 DIAGNOSIS — E66.9 OBESITY (BMI 30-39.9): ICD-10-CM

## 2022-12-20 DIAGNOSIS — G47.33 OBSTRUCTIVE SLEEP APNEA: ICD-10-CM

## 2022-12-20 PROCEDURE — 95806 SLEEP STUDY UNATT&RESP EFFT: CPT | Performed by: FAMILY MEDICINE

## 2022-12-20 PROCEDURE — 95806 SLEEP STUDY UNATT&RESP EFFT: CPT

## 2023-01-11 DIAGNOSIS — G47.33 SEVERE OBSTRUCTIVE SLEEP APNEA: Primary | ICD-10-CM

## 2023-01-11 DIAGNOSIS — E66.09 CLASS 2 OBESITY DUE TO EXCESS CALORIES WITHOUT SERIOUS COMORBIDITY WITH BODY MASS INDEX (BMI) OF 38.0 TO 38.9 IN ADULT: ICD-10-CM

## 2023-03-29 ENCOUNTER — OFFICE VISIT (OUTPATIENT)
Dept: SLEEP MEDICINE | Facility: HOSPITAL | Age: 33
End: 2023-03-29
Payer: COMMERCIAL

## 2023-03-29 VITALS
BODY MASS INDEX: 39.76 KG/M2 | SYSTOLIC BLOOD PRESSURE: 144 MMHG | OXYGEN SATURATION: 96 % | HEIGHT: 73 IN | WEIGHT: 300 LBS | DIASTOLIC BLOOD PRESSURE: 84 MMHG | HEART RATE: 69 BPM

## 2023-03-29 DIAGNOSIS — E66.9 CLASS 2 OBESITY WITH BODY MASS INDEX (BMI) OF 39.0 TO 39.9 IN ADULT, UNSPECIFIED OBESITY TYPE, UNSPECIFIED WHETHER SERIOUS COMORBIDITY PRESENT: ICD-10-CM

## 2023-03-29 DIAGNOSIS — Z99.89 OSA ON CPAP: Primary | ICD-10-CM

## 2023-03-29 DIAGNOSIS — G47.33 OSA ON CPAP: Primary | ICD-10-CM

## 2023-03-29 PROCEDURE — G0463 HOSPITAL OUTPT CLINIC VISIT: HCPCS

## 2023-03-29 PROCEDURE — 99213 OFFICE O/P EST LOW 20 MIN: CPT | Performed by: NURSE PRACTITIONER

## 2023-03-29 NOTE — PROGRESS NOTES
"  [facility]  [address]  [address]  [place] [address]  Phone   Fax         SLEEP CLINIC FOLLOW-UP PROGRESS NOTE    León Martinez  [id number]   1990  32 y.o.  male      PCP: Timothy Carlson MD    DATE OF VISIT: 3/29/2023        CHIEF COMPLAINT: Obstructive sleep apnea on CPAP    HPI:  This is a pleasant 32 y.o. year old patient who presents to the clinic today for the management of obstructive sleep apnea.  He is a 7 and half month old daughter.  Patient had a home sleep study 12/2022 showing severe obstructive sleep apnea with AHI of 68.9/hr.  This patient is using positive airway pressure therapy with auto CPAP, and the symptoms of sleep apnea have improved significantly with this therapy.  Patient usually goes to bed around 1030 PM and wakes up around 630 AM .  This patient wakes up less than 1 time(s) during the night to use the restroom and does not have issues going back to sleep.  Patient feels refreshed upon awakening for the day.  He has only had his new machine for 19 days and has used it every day for the past 19 days, at least 4 hours a night.      MEDICATIONS: reviewed     ALLERGIES:  Naproxen sodium    SOCIAL HISTORY (habits pertaining to sleep medicine):  • History tobacco use: No   • History of alcohol use: 6 per week  • Caffeine use: 4     REVIEW OF SYSTEMS:   Pertinent positive symptoms are:  • [place] Sleepiness Scale :Total score: 2   • Nasal congestion--using Flonase        PHYSICAL EXAMINATION:  CONSTITUTIONAL:  Vitals:    03/29/23 0911   BP: 144/84   Pulse: 69   SpO2: 96%   Weight: 136 kg (300 lb)   Height: 185.4 cm (73\")    Body mass index is 39.58 kg/m².   HEAD: atraumatic, normocephalic  RESP SYSTEM: not in respiratory distress, breathing unlabored  CARDIOVASULAR: normal rate, no edema noted   NEURO: Alert and oriented x 3, mood and affect appeared appropriate      DATA REVIEWED:  The Smart card downloaded on 3/29/2023 has " been reviewed independently by me for compliance and discussed the data with the patient.   Patient has only had new CPAP for the last 19 days.  Has used every day for the last 19 days, always more than 4 hours.  Residual AHI: 1.4 /hr (goal < 5.0 /hr)  Mask type: Fullface  Device: AirSense 11 auto CPAP  DME: [facility]      ASSESSMENT AND PLAN:  · Obstructive Sleep Apnea (G 47.33): sleep apnea symptoms have improved with the device and treatment.  Patient has excellent compliance with the device for treatment of sleep apnea.  I have personally reviewed the smart card download and discussed the download data with the patient and encouarged continued use of the device.  The residual AHI is acceptable. The device is benefiting the patient and the device is medically necessary.  Without adequate treatment of sleep apnea and without good compliance, patient would be at increased risk of hypertension, diabetes mellitus, pulmonary hypertension, atrial fibrillation, stroke, and nonrestorative sleep with hypersomnia which could increase risk of motor vehicle accidents. The patient is also instructed to get the supplies from the [facility] and and change them on a regular basis.  A prescription for supplies has been sent to the [facility].  I have also discussed with this patient the importance of good sleep hygiene and getting an adequate amount of sleep to aid in overall good health.   · Obesity: Body mass index is 39.58 kg/m².. Patients who are overweight or obese are at increased risk of sleep apnea/ sleep disordered breathing. Weight reduction and healthy lifestyle are encouraged in overweight/ obese patients as part of a comprehensive approach to sleep apnea treatment.    · Hypothyroidism  · Hyperlipidemia      Patient will follow-up in 1 year with Dr. Cuevas or follow-up sooner for any issues or concerns.  Patient's questions were answered.          Thank you for allowing me to participate in the care of this patient.      Kayla Robles, MALINA, APRN  [facility] Sleep Medicine

## 2023-05-14 DIAGNOSIS — E03.9 ACQUIRED HYPOTHYROIDISM: ICD-10-CM

## 2023-05-15 RX ORDER — LEVOTHYROXINE SODIUM 0.1 MG/1
TABLET ORAL
Qty: 90 TABLET | Refills: 0 | Status: SHIPPED | OUTPATIENT
Start: 2023-05-15

## 2023-05-15 RX ORDER — ATORVASTATIN CALCIUM 20 MG/1
TABLET, FILM COATED ORAL
Qty: 90 TABLET | Refills: 0 | Status: SHIPPED | OUTPATIENT
Start: 2023-05-15

## 2023-08-14 DIAGNOSIS — E03.9 ACQUIRED HYPOTHYROIDISM: ICD-10-CM

## 2023-08-14 RX ORDER — ATORVASTATIN CALCIUM 20 MG/1
TABLET, FILM COATED ORAL
Qty: 90 TABLET | Refills: 0 | Status: SHIPPED | OUTPATIENT
Start: 2023-08-14

## 2023-08-14 RX ORDER — LEVOTHYROXINE SODIUM 0.1 MG/1
TABLET ORAL
Qty: 90 TABLET | Refills: 0 | Status: SHIPPED | OUTPATIENT
Start: 2023-08-14

## 2023-11-12 DIAGNOSIS — E03.9 ACQUIRED HYPOTHYROIDISM: ICD-10-CM

## 2023-11-13 RX ORDER — LEVOTHYROXINE SODIUM 0.1 MG/1
TABLET ORAL
Qty: 15 TABLET | Refills: 0 | Status: SHIPPED | OUTPATIENT
Start: 2023-11-13

## 2023-11-13 RX ORDER — ATORVASTATIN CALCIUM 20 MG/1
TABLET, FILM COATED ORAL
Qty: 15 TABLET | Refills: 0 | Status: SHIPPED | OUTPATIENT
Start: 2023-11-13

## 2023-11-29 DIAGNOSIS — E03.9 ACQUIRED HYPOTHYROIDISM: ICD-10-CM

## 2023-11-29 RX ORDER — LEVOTHYROXINE SODIUM 0.1 MG/1
100 TABLET ORAL DAILY
Qty: 30 TABLET | Refills: 0 | Status: SHIPPED | OUTPATIENT
Start: 2023-11-29

## 2023-11-29 RX ORDER — ATORVASTATIN CALCIUM 20 MG/1
20 TABLET, FILM COATED ORAL DAILY
Qty: 30 TABLET | Refills: 0 | Status: SHIPPED | OUTPATIENT
Start: 2023-11-29

## 2023-12-07 ENCOUNTER — OFFICE VISIT (OUTPATIENT)
Dept: FAMILY MEDICINE CLINIC | Facility: CLINIC | Age: 33
End: 2023-12-07
Payer: COMMERCIAL

## 2023-12-07 VITALS
HEIGHT: 73 IN | BODY MASS INDEX: 38.17 KG/M2 | DIASTOLIC BLOOD PRESSURE: 78 MMHG | HEART RATE: 63 BPM | SYSTOLIC BLOOD PRESSURE: 130 MMHG | OXYGEN SATURATION: 98 % | RESPIRATION RATE: 18 BRPM | WEIGHT: 288 LBS

## 2023-12-07 DIAGNOSIS — Z13.1 SCREENING FOR DIABETES MELLITUS: ICD-10-CM

## 2023-12-07 DIAGNOSIS — E03.9 ACQUIRED HYPOTHYROIDISM: ICD-10-CM

## 2023-12-07 DIAGNOSIS — E66.09 CLASS 2 OBESITY DUE TO EXCESS CALORIES WITHOUT SERIOUS COMORBIDITY WITH BODY MASS INDEX (BMI) OF 38.0 TO 38.9 IN ADULT: ICD-10-CM

## 2023-12-07 DIAGNOSIS — Z00.00 ROUTINE HEALTH MAINTENANCE: Primary | ICD-10-CM

## 2023-12-07 DIAGNOSIS — E78.1 HYPERTRIGLYCERIDEMIA: ICD-10-CM

## 2023-12-07 RX ORDER — LEVOTHYROXINE SODIUM 0.1 MG/1
100 TABLET ORAL DAILY
Qty: 90 TABLET | Refills: 3 | Status: SHIPPED | OUTPATIENT
Start: 2023-12-07

## 2023-12-07 RX ORDER — ATORVASTATIN CALCIUM 20 MG/1
20 TABLET, FILM COATED ORAL DAILY
Qty: 90 TABLET | Refills: 3 | Status: SHIPPED | OUTPATIENT
Start: 2023-12-07

## 2023-12-07 NOTE — PATIENT INSTRUCTIONS
Plexus Wheel Plus    Theracane    Mindfulness apps: Headspace, Smiling Mind, Liberate, Sowlmate    Mindfulness-Based Stress Reduction  Mindfulness-based stress reduction (MBSR) is a program that helps people learn to practice mindfulness. Mindfulness is the practice of intentionally paying attention to the present moment. It can be learned and practiced through techniques such as education, breathing exercises, meditation, and yoga. MBSR includes several mindfulness techniques in one program.  MBSR works best when you understand the treatment, are willing to try new things, and can commit to spending time practicing what you learn. MBSR training may include learning about:  How your emotions, thoughts, and reactions affect your body.  New ways to respond to things that cause negative thoughts to start (triggers).  How to notice your thoughts and let go of them.  Practicing awareness of everyday things that you normally do without thinking.  The techniques and goals of different types of meditation.  What are the benefits of MBSR?  MBSR can have many benefits, which include helping you to:  Develop self-awareness. This refers to knowing and understanding yourself.  Learn skills and attitudes that help you to participate in your own health care.  Learn new ways to care for yourself.  Be more accepting about how things are, and let things go.  Be less judgmental and approach things with an open mind.  Be patient with yourself and trust yourself more.  MBSR has also been shown to:  Reduce negative emotions, such as depression and anxiety.  Improve memory and focus.  Change how you sense and approach pain.  Boost your body's ability to fight infections.  Help you connect better with other people.  Improve your sense of well-being.  Follow these instructions at home:    Find a local in-person or online MBSR program.  Set aside some time regularly for mindfulness practice.  Find a mindfulness practice that works best for  you. This may include one or more of the following:  Meditation. Meditation involves focusing your mind on a certain thought or activity.  Breathing awareness exercises. These help you to stay present by focusing on your breath.  Body scan. For this practice, you lie down and pay attention to each part of your body from head to toe. You can identify tension and soreness and intentionally relax parts of your body.  Yoga. Yoga involves stretching and breathing, and it can improve your ability to move and be flexible. It can also provide an experience of testing your body's limits, which can help you release stress.  Mindful eating. This way of eating involves focusing on the taste, texture, color, and smell of each bite of food. Because this slows down eating and helps you feel full sooner, it can be an important part of a weight-loss plan.  Find a podcast or recording that provides guidance for breathing awareness, body scan, or meditation exercises. You can listen to these any time when you have a free moment to rest without distractions.  Follow your treatment plan as told by your health care provider. This may include taking regular medicines and making changes to your diet or lifestyle as recommended.  How to practice mindfulness  To do a basic awareness exercise:  Find a comfortable place to sit.  Pay attention to the present moment. Observe your thoughts, feelings, and surroundings just as they are.  Avoid placing judgment on yourself, your feelings, or your surroundings. Make note of any judgment that comes up, and let it go.  Your mind may wander, and that is okay. Make note of when your thoughts drift, and return your attention to the present moment.  To do basic mindfulness meditation:  Find a comfortable place to sit. This may include a stable chair or a firm floor cushion.  Sit upright with your back straight. Let your arms fall next to your side with your hands resting on your legs.  If sitting in a  chair, rest your feet flat on the floor.  If sitting on a cushion, cross your legs in front of you.  Keep your head in a neutral position with your chin dropped slightly. Relax your jaw and rest the tip of your tongue on the roof of your mouth. Drop your gaze to the floor. You can close your eyes if you like.  Breathe normally and pay attention to your breath. Feel the air moving in and out of your nose. Feel your belly expanding and relaxing with each breath.  Your mind may wander, and that is okay. Make note of when your thoughts drift, and return your attention to your breath.  Avoid placing judgment on yourself, your feelings, or your surroundings. Make note of any judgment or feelings that come up, let them go, and bring your attention back to your breath.  When you are ready, lift your gaze or open your eyes. Pay attention to how your body feels after the meditation.  Where to find more information  You can find more information about MBSR from:  Your health care provider.  Community-based meditation centers or programs.  Programs offered near you.  Summary  Mindfulness-based stress reduction (MBSR) is a program that teaches you how to intentionally pay attention to the present moment. It is used with other treatments to help you cope better with daily stress, emotions, and pain.  MBSR focuses on developing self-awareness, which allows you to respond to life stress without judgment or negative emotions.  MBSR programs may involve learning different mindfulness practices, such as breathing exercises, meditation, yoga, body scan, or mindful eating. Find a mindfulness practice that works best for you, and set aside time for it on a regular basis.  This information is not intended to replace advice given to you by your health care provider. Make sure you discuss any questions you have with your health care provider.  Document Released: 04/26/2018 Document Revised: 11/30/2018 Document Reviewed: 04/26/2018  [employer]  Patient Education © 2020 [employer]

## 2023-12-07 NOTE — PROGRESS NOTES
"Chief Complaint  Annual Exam and Elbow Injury (R elbow )    Subjective    History of Present Illness {CC  Problem List  Visit  Diagnosis   Encounters  Notes  Medications  Labs  Result Review Imaging  Media :23}     León Martinez presents to [facility] for Annual Exam and Elbow Injury (R elbow ).  History of Present Illness     Today for annual exam and discussion of preventive health maintenance.    Doing well overall, considers himself quite healthy. Needs refills of atorvastatin and levothyroxine today. Also due for some routine blood work. No signs of high or low thyroid function at this time. Reports good adherence and tolerance to his regimen. No unwanted side effects.    No major changes to diet, has been exercising more. Weight is overall down over the past several years.    Has good family and social support. 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    Objective     Vital Signs:   /78   Pulse 63   Resp 18   Ht 185.4 cm (73\")   Wt 131 kg (288 lb)   SpO2 98%   BMI 38.00 kg/m²   Physical Exam  Vitals and nursing note reviewed.   Constitutional:       General: He is not in acute distress.     Appearance: Normal appearance. He is not ill-appearing.   Cardiovascular:      Rate and Rhythm: Normal rate and regular rhythm.      Pulses: Normal pulses.      Heart sounds: Normal heart sounds. No murmur heard.  Pulmonary:      Effort: Pulmonary effort is normal. No respiratory distress.      Breath sounds: Normal breath sounds. No rales.   Neurological:      Mental Status: He is alert and oriented to person, place, and time. Mental status is at baseline.   Psychiatric:         Mood and Affect: Mood normal.         Behavior: Behavior normal.          Result Review  Data Reviewed:{ Labs  Result Review  Imaging  Med Tab  Media :23}                   Assessment and Plan {CC Problem List  Visit Diagnosis  ROS  Review (Popup)  Health Maintenance  Quality  " BestPractice  Medications  SmartSets  SnapShot Encounters  Media :23}   Diagnoses and all orders for this visit:    1. Routine health maintenance (Primary)    2. Hypertriglyceridemia  -     Comprehensive Metabolic Panel  -     Lipid Panel  -     atorvastatin (LIPITOR) 20 MG tablet; Take 1 tablet by mouth Daily.  Dispense: 90 tablet; Refill: 3    3. Acquired hypothyroidism  -     TSH Rfx On Abnormal To Free T4  -     levothyroxine (SYNTHROID, LEVOTHROID) 100 MCG tablet; Take 1 tablet by mouth Daily.  Dispense: 90 tablet; Refill: 3    4. Class 2 obesity due to excess calories without serious comorbidity with body mass index (BMI) of 38.0 to 38.9 in adult  -     Comprehensive Metabolic Panel    5. Screening for diabetes mellitus  -     Comprehensive Metabolic Panel    Orders as above. Continue regimen as prescribed. I will contact him with lab results as available and we will make any adjustments that are needed.    Class 2 Severe Obesity (BMI >=35 and <=39.9). Obesity-related health conditions include the following: dyslipidemias. Obesity is improving with lifestyle modifications. BMI is is above average; BMI management plan is completed. We discussed portion control and increasing exercise.    Encouraged to continue working on healthy lifestyle habits. Recommended follow up as below. Encouraged communication via Calerat in the meantime.     Patient was given instructions and counseling regarding his condition or for health maintenance advice. Please see specific information pulled into the AVS (placed there by myself) if appropriate.    Return in about 6 months (around 6/7/2024), or if symptoms worsen or fail to improve, for f/u hyperlipidemia.    SILVIA Carlson MD    Prevention counseling performed as below: Mindfulness for stress management.

## 2023-12-08 LAB
ALBUMIN SERPL-MCNC: 5.2 G/DL (ref 4.1–5.1)
ALBUMIN/GLOB SERPL: 2.4 {RATIO} (ref 1.2–2.2)
ALP SERPL-CCNC: 47 IU/L (ref 44–121)
ALT SERPL-CCNC: 32 IU/L (ref 0–44)
AST SERPL-CCNC: 21 IU/L (ref 0–40)
BILIRUB SERPL-MCNC: 1 MG/DL (ref 0–1.2)
BUN SERPL-MCNC: 12 MG/DL (ref 6–20)
BUN/CREAT SERPL: 13 (ref 9–20)
CALCIUM SERPL-MCNC: 9.9 MG/DL (ref 8.7–10.2)
CHLORIDE SERPL-SCNC: 100 MMOL/L (ref 96–106)
CHOLEST SERPL-MCNC: 141 MG/DL (ref 100–199)
CO2 SERPL-SCNC: 24 MMOL/L (ref 20–29)
CREAT SERPL-MCNC: 0.96 MG/DL (ref 0.76–1.27)
EGFRCR SERPLBLD CKD-EPI 2021: 107 ML/MIN/1.73
GLOBULIN SER CALC-MCNC: 2.2 G/DL (ref 1.5–4.5)
GLUCOSE SERPL-MCNC: 79 MG/DL (ref 70–99)
HDLC SERPL-MCNC: 40 MG/DL
LDLC SERPL CALC-MCNC: 66 MG/DL (ref 0–99)
POTASSIUM SERPL-SCNC: 4.1 MMOL/L (ref 3.5–5.2)
PROT SERPL-MCNC: 7.4 G/DL (ref 6–8.5)
SODIUM SERPL-SCNC: 141 MMOL/L (ref 134–144)
T4 FREE SERPL-MCNC: 1.72 NG/DL (ref 0.82–1.77)
TRIGL SERPL-MCNC: 216 MG/DL (ref 0–149)
TSH SERPL DL<=0.005 MIU/L-ACNC: 0.41 UIU/ML (ref 0.45–4.5)
VLDLC SERPL CALC-MCNC: 35 MG/DL (ref 5–40)

## 2024-03-20 ENCOUNTER — APPOINTMENT (OUTPATIENT)
Dept: CT IMAGING | Facility: HOSPITAL | Age: 34
End: 2024-03-20
Payer: COMMERCIAL

## 2024-03-20 ENCOUNTER — HOSPITAL ENCOUNTER (EMERGENCY)
Facility: HOSPITAL | Age: 34
Discharge: HOME | End: 2024-03-20
Attending: EMERGENCY MEDICINE | Admitting: EMERGENCY MEDICINE
Payer: COMMERCIAL

## 2024-03-20 VITALS
HEIGHT: 73 IN | BODY MASS INDEX: 37.11 KG/M2 | OXYGEN SATURATION: 98 % | RESPIRATION RATE: 16 BRPM | DIASTOLIC BLOOD PRESSURE: 95 MMHG | WEIGHT: 280 LBS | TEMPERATURE: 98.3 F | SYSTOLIC BLOOD PRESSURE: 152 MMHG | HEART RATE: 66 BPM

## 2024-03-20 DIAGNOSIS — K11.20 SIALADENITIS: Primary | ICD-10-CM

## 2024-03-20 LAB
ANION GAP SERPL CALCULATED.3IONS-SCNC: 9.9 MMOL/L (ref 5–15)
BASOPHILS # BLD AUTO: 0.01 10*3/MM3 (ref 0–0.2)
BASOPHILS NFR BLD AUTO: 0.2 % (ref 0–1.5)
BUN SERPL-MCNC: 13 MG/DL (ref 6–20)
BUN/CREAT SERPL: 12.9 (ref 7–25)
CALCIUM SPEC-SCNC: 9.9 MG/DL (ref 8.6–10.5)
CHLORIDE SERPL-SCNC: 103 MMOL/L (ref 98–107)
CO2 SERPL-SCNC: 25.1 MMOL/L (ref 22–29)
CREAT SERPL-MCNC: 1.01 MG/DL (ref 0.76–1.27)
DEPRECATED RDW RBC AUTO: 38.9 FL (ref 37–54)
EGFRCR SERPLBLD CKD-EPI 2021: 100.7 ML/MIN/1.73
EOSINOPHIL # BLD AUTO: 0.07 10*3/MM3 (ref 0–0.4)
EOSINOPHIL NFR BLD AUTO: 1.2 % (ref 0.3–6.2)
ERYTHROCYTE [DISTWIDTH] IN BLOOD BY AUTOMATED COUNT: 11.7 % (ref 12.3–15.4)
GLUCOSE SERPL-MCNC: 93 MG/DL (ref 65–99)
HCT VFR BLD AUTO: 45.7 % (ref 37.5–51)
HGB BLD-MCNC: 15.6 G/DL (ref 13–17.7)
IMM GRANULOCYTES # BLD AUTO: 0.05 10*3/MM3 (ref 0–0.05)
IMM GRANULOCYTES NFR BLD AUTO: 0.9 % (ref 0–0.5)
LYMPHOCYTES # BLD AUTO: 1.41 10*3/MM3 (ref 0.7–3.1)
LYMPHOCYTES NFR BLD AUTO: 24.1 % (ref 19.6–45.3)
MCH RBC QN AUTO: 30.8 PG (ref 26.6–33)
MCHC RBC AUTO-ENTMCNC: 34.1 G/DL (ref 31.5–35.7)
MCV RBC AUTO: 90.1 FL (ref 79–97)
MONOCYTES # BLD AUTO: 0.53 10*3/MM3 (ref 0.1–0.9)
MONOCYTES NFR BLD AUTO: 9.1 % (ref 5–12)
NEUTROPHILS NFR BLD AUTO: 3.78 10*3/MM3 (ref 1.7–7)
NEUTROPHILS NFR BLD AUTO: 64.5 % (ref 42.7–76)
PLATELET # BLD AUTO: 130 10*3/MM3 (ref 140–450)
PMV BLD AUTO: 8.5 FL (ref 6–12)
POTASSIUM SERPL-SCNC: 4.1 MMOL/L (ref 3.5–5.2)
RBC # BLD AUTO: 5.07 10*6/MM3 (ref 4.14–5.8)
SODIUM SERPL-SCNC: 138 MMOL/L (ref 136–145)
WBC NRBC COR # BLD AUTO: 5.85 10*3/MM3 (ref 3.4–10.8)

## 2024-03-20 PROCEDURE — 85025 COMPLETE CBC W/AUTO DIFF WBC: CPT | Performed by: EMERGENCY MEDICINE

## 2024-03-20 PROCEDURE — 70492 CT SFT TSUE NCK W/O & W/DYE: CPT

## 2024-03-20 PROCEDURE — 99283 EMERGENCY DEPT VISIT LOW MDM: CPT | Performed by: EMERGENCY MEDICINE

## 2024-03-20 PROCEDURE — 25510000001 IOPAMIDOL PER 1 ML: Performed by: EMERGENCY MEDICINE

## 2024-03-20 PROCEDURE — 80048 BASIC METABOLIC PNL TOTAL CA: CPT | Performed by: EMERGENCY MEDICINE

## 2024-03-20 PROCEDURE — 99285 EMERGENCY DEPT VISIT HI MDM: CPT

## 2024-03-20 RX ORDER — AMOXICILLIN AND CLAVULANATE POTASSIUM 875; 125 MG/1; MG/1
1 TABLET, FILM COATED ORAL 2 TIMES DAILY
Qty: 14 TABLET | Refills: 0 | Status: SHIPPED | OUTPATIENT
Start: 2024-03-20 | End: 2024-03-27

## 2024-03-20 RX ADMIN — IOPAMIDOL 75 ML: 755 INJECTION, SOLUTION INTRAVENOUS at 12:44

## 2024-03-20 NOTE — DISCHARGE INSTRUCTIONS
Lemon drop lozenges as directed  Return ED fever, pain, swelling, facial redness, worse condition, any other concerns

## 2024-03-20 NOTE — FSED PROVIDER NOTE
"Subjective   History of Present Illness  32yo male pmh significant hyperlipidemia/hypothyroid presents ED c/o acute onset right mandibular pain/swelling while eating breakfast this morning.  Pt reported \"sharp\" pain maximally located angle of mandible (R), extending submandibular.  Pt seen urgent care et referred ED for further evaluation.  ROS neg fever/chills/trauma/soa/odynophagia/nausea/vomiting.    History provided by:  Patient  Facial Pain      Review of Systems   Constitutional: Negative.    HENT:  Positive for facial swelling.    Eyes: Negative.    Respiratory: Negative.     Cardiovascular: Negative.    Gastrointestinal: Negative.    Allergic/Immunologic: Negative for immunocompromised state.   All other systems reviewed and are negative.      Past Medical History:   Diagnosis Date    Allergic     Asthma     Hyperthyroidism        Allergies   Allergen Reactions    Naproxen Sodium Rash     Can take prescription naproxen, had a reaction to an OTC Aleve  Can take prescription naproxen, had a reaction to an OTC Aleve  Can take prescription naproxen, had a reaction to an OTC Aleve       Past Surgical History:   Procedure Laterality Date    OTHER SURGICAL HISTORY  1990    Uretur Tube     TONSILLECTOMY         Family History   Problem Relation Age of Onset    Obesity Mother     Migraines Mother     Hyperlipidemia Father     Obesity Father     Hypertension Father     Migraines Sister     No Known Problems Sister     Testicular cancer Paternal Uncle     Prostate cancer Paternal Uncle     Diabetes Maternal Grandmother     Diabetes Maternal Grandfather     Lung cancer Maternal Grandfather     Hyperlipidemia Paternal Grandmother     Hypertension Paternal Grandmother     Hyperlipidemia Paternal Grandfather     Hypertension Paternal Grandfather     Colon cancer Neg Hx        Social History     Socioeconomic History    Marital status:    Tobacco Use    Smoking status: Never    Smokeless tobacco: Never   Substance " and Sexual Activity    Alcohol use: Yes     Comment: less than 10 per week    Drug use: Never    Sexual activity: Defer     Partners: Female     Comment: wife           Objective   Physical Exam  Vitals and nursing note reviewed.   Constitutional:       Appearance: Normal appearance.   HENT:      Head: Normocephalic and atraumatic.      Jaw: There is normal jaw occlusion. Tenderness present. No trismus, swelling, pain on movement or malocclusion.        Comments: Negative trismus  Tender palpation right parotid/angle mandible as well as right submandibular. Negative erythema/calor/induration/fluctuance.     Right Ear: Tympanic membrane, ear canal and external ear normal.      Left Ear: Tympanic membrane, ear canal and external ear normal.      Nose: Nose normal.      Mouth/Throat:      Mouth: Mucous membranes are moist.      Pharynx: Oropharynx is clear. No oropharyngeal exudate or posterior oropharyngeal erythema.   Eyes:      Conjunctiva/sclera: Conjunctivae normal.   Cardiovascular:      Rate and Rhythm: Normal rate and regular rhythm.      Pulses: Normal pulses.      Heart sounds: Normal heart sounds. No murmur heard.     No friction rub. No gallop.   Pulmonary:      Effort: Pulmonary effort is normal.      Breath sounds: Normal breath sounds. No wheezing, rhonchi or rales.   Abdominal:      General: Abdomen is flat. Bowel sounds are normal. There is no distension.      Palpations: Abdomen is soft.      Tenderness: There is no abdominal tenderness. There is no guarding or rebound.   Musculoskeletal:         General: No swelling or deformity. Normal range of motion.      Cervical back: Normal range of motion and neck supple. No rigidity.   Lymphadenopathy:      Cervical: No cervical adenopathy.   Skin:     General: Skin is warm and dry.   Neurological:      General: No focal deficit present.      Mental Status: He is alert and oriented to person, place, and time.         Procedures           ED Course      Labs  Reviewed   CBC WITH AUTO DIFFERENTIAL - Abnormal; Notable for the following components:       Result Value    RDW 11.7 (*)     Platelets 130 (*)     Immature Grans % 0.9 (*)     All other components within normal limits   BASIC METABOLIC PANEL - Normal    Narrative:     GFR Normal >60  Chronic Kidney Disease <60  Kidney Failure <15     CBC AND DIFFERENTIAL    Narrative:     The following orders were created for panel order CBC & Differential.  Procedure                               Abnormality         Status                     ---------                               -----------         ------                     CBC Auto Differential[[id number]]        Abnormal            Final result                 Please view results for these tests on the individual orders.     CT Soft Tissue Neck With & Without Contrast    Result Date: 3/20/2024  Narrative: CT NECK WITH AND WITHOUT CONTRAST  HISTORY: Right-sided neck swelling.  COMPARISON: None.  FINDINGS: Initially, noncontrasted CT examination neck was performed. There is no evidence of abnormal calcification.  A CT examination of the neck with contrast followed. A radiopaque marker was placed over the area of concern. The radiopaque marker overlies the right submandibular gland.  The right submandibular gland is slightly larger than that of the left. It measures approximately 3.5 x 3.0 cm in AP and transverse dimensions respectively. The left submandibular gland measures 2.6 x 3.0 cm in size. There is mild heterogeneous enhancement of the right submandibular gland.  The parotid and thyroid glands appear unremarkable. Small subcentimeter nodes are identified involving the posterior triangle of the neck bilaterally. The cords are symmetrical.      Impression: The right submandibular gland is slightly larger than that of the left. The appearance is nonspecific. This may represent mild and early sialoadenitis. Clinical correlation is recommended.  The above information was  called to and discussed with Dr. De Santiago.    Radiation dose reduction techniques were utilized, including automated exposure control and exposure modulation based on body size.                                           Medical Decision Making  Labs/radiographic findings reviewed.  CBC/BMP: unremarkable. Nontoxic appearance.  CT neck: mild enlargement heterogenous enhancement right submandibular gland consistent with early sialadenitis.  AFVSS. Stable discharge w/supportive care et pmd/ENT followup.  Plan sialagogues/augmentin 875mg bid x7d.  Strict return precautions provided.    Problems Addressed:  Sialadenitis: complicated acute illness or injury    Amount and/or Complexity of Data Reviewed  Labs: ordered.  Radiology: ordered.    Risk  Prescription drug management.        Final diagnoses:   Sialadenitis       ED Disposition  ED Disposition       ED Disposition   Discharge    Condition   Good    Comment   --               Timothy Carlson MD  [address] [address]  [phone]    Schedule an appointment as soon as possible for a visit       EwingJosias MD  [address] [address]  [phone]    In 2 days           Medication List        New Prescriptions      amoxicillin-clavulanate 875-125 MG per tablet  Commonly known as: AUGMENTIN  Take 1 tablet by mouth 2 (Two) Times a Day for 7 days.               Where to Get Your Medications        These medications were sent to [facility]/pharmacy #[id number] - [place], [place] - [address] AT [facility] - [phone] [facility] [phone]   [address], [place] [address]      Hours: 24-hours Phone: [phone]   amoxicillin-clavulanate 875-125 MG per tablet

## 2024-03-27 ENCOUNTER — OFFICE VISIT (OUTPATIENT)
Dept: SLEEP MEDICINE | Facility: HOSPITAL | Age: 34
End: 2024-03-27
Payer: COMMERCIAL

## 2024-03-27 VITALS — BODY MASS INDEX: 39.03 KG/M2 | HEART RATE: 72 BPM | OXYGEN SATURATION: 96 % | HEIGHT: 73 IN | WEIGHT: 294.5 LBS

## 2024-03-27 DIAGNOSIS — G47.33 SEVERE OBSTRUCTIVE SLEEP APNEA: Primary | ICD-10-CM

## 2024-03-27 DIAGNOSIS — E66.9 OBESITY (BMI 30-39.9): ICD-10-CM

## 2024-03-27 PROCEDURE — G0463 HOSPITAL OUTPT CLINIC VISIT: HCPCS

## 2024-03-27 PROCEDURE — 99213 OFFICE O/P EST LOW 20 MIN: CPT | Performed by: FAMILY MEDICINE

## 2024-03-27 NOTE — PROGRESS NOTES
"Follow Up Sleep Disorders Center Note     Chief Complaint:  RODOLFO     Primary Care Physician: Timothy Carlson MD    Interval History:   The patient is a 33 y.o. male  who was last seen in the sleep lab: 3/29/2023.  Presents today for annual follow-up of RODOLFO.  HST in 2022 showed AHI of 68.9.  Patient is on auto CPAP.  Today reports no pauses mask machine.  Some nonrestorative sleep understandably secondary to  baby boy at home and 1-1/2-year-old daughter.    Downloaded PAP Data Reviewed For Compliance:  DME is [employer].  Downloads between 2023 - 3/25/2024.  Average usage is 7 hours 27 minutes.  Average AHI is 2.0..  Average auto CPAP pressure is 13.5 cm H2O    I have reviewed the above results and compared them with the patient's last downloads and reviewed with the patient.    Review of Systems:    A complete review of systems was done and all were negative with the exception of: All negative    Social History:    Social History     Socioeconomic History    Marital status:    Tobacco Use    Smoking status: Never    Smokeless tobacco: Never   Substance and Sexual Activity    Alcohol use: Yes     Comment: less than 10 per week    Drug use: Never    Sexual activity: Defer     Partners: Female     Comment: wife       Allergies:  Naproxen sodium     Medication Review:  Reviewed.      Vital Signs:    Vitals:    24 0900   Pulse: 72   SpO2: 96%   Weight: 134 kg (294 lb 8 oz)   Height: 185.4 cm (73\")     Body mass index is 38.85 kg/m².    Physical Exam:    Constitutional:  Well developed 33 y.o. male that appears in no apparent distress.  Awake & oriented times 3.  Normal mood with normal recent and remote memory and normal judgement.  Eyes:  Conjunctivae normal.  Oropharynx: Previously, moist mucous membranes.    Self-administered [place] Sleepiness Scale test results: 4  0-5 Lower normal daytime sleepiness  6-10 Higher normal daytime sleepiness  11-12 Mild, 13-15 Moderate, & 16-24 " Severe excessive daytime sleepiness    Impression:   1. Severe obstructive sleep apnea    2. Obesity (BMI 30-39.9)        Obstructive sleep apnea adequately treated with auto CPAP. The patient appears to be at goal with good compliance and usage.     Plan:  Good sleep hygiene measures should be maintained.  Weight loss would be beneficial in this patient who obese by Body mass index is 38.85 kg/m²..      After evaluating the patient and assessing results available, the patient is benefiting from the treatment being provided.     The patient will continue auto CPAP.  After clinical evaluation and review of downloads, I recommend no changes to the patient's pressures.  A new prescription will be sent to the patient's DME.    Caution during activities that require prolonged concentration is strongly advised if sleepiness returns. Changing of PAP supplies regularly is important for effective use. Patient needs to change cushion on the mask or plugs on nasal pillows along with disposable filters once every month and change mask frame, tubing, headgear and Velcro straps every 6 months at the minimum.    I answered all of the patient's questions.  The patient will call for any problems and will follow up in 1 year.      Baron Cuevas MD  Sleep Medicine  03/27/24  09:35 EDT

## 2024-05-15 ENCOUNTER — OFFICE VISIT (OUTPATIENT)
Dept: FAMILY MEDICINE CLINIC | Facility: CLINIC | Age: 34
End: 2024-05-15
Payer: COMMERCIAL

## 2024-05-15 VITALS
HEIGHT: 73 IN | WEIGHT: 281 LBS | OXYGEN SATURATION: 96 % | DIASTOLIC BLOOD PRESSURE: 102 MMHG | SYSTOLIC BLOOD PRESSURE: 160 MMHG | RESPIRATION RATE: 16 BRPM | HEART RATE: 73 BPM | BODY MASS INDEX: 37.24 KG/M2

## 2024-05-15 DIAGNOSIS — F43.23 ADJUSTMENT REACTION WITH ANXIETY AND DEPRESSION: Primary | ICD-10-CM

## 2024-05-15 DIAGNOSIS — E78.1 HYPERTRIGLYCERIDEMIA: ICD-10-CM

## 2024-05-15 DIAGNOSIS — K11.20 SIALOADENITIS: ICD-10-CM

## 2024-05-15 PROCEDURE — 99213 OFFICE O/P EST LOW 20 MIN: CPT | Performed by: FAMILY MEDICINE

## 2024-05-15 RX ORDER — LORATADINE 10 MG/1
1 TABLET ORAL DAILY
COMMUNITY
Start: 2024-04-26

## 2024-05-15 NOTE — PROGRESS NOTES
"Chief Complaint  Hyperlipidemia, Mental Health Problem (Male post partum depression - birth of child 3 mo ago.), and Follow-up (From ER, blocked salivary glands. Suggested ENT. Unable to get in timely.)    Subjective    History of Present Illness    León Martinez presents to [facility] for Hyperlipidemia, Mental Health Problem (Male post partum depression - birth of child 3 mo ago.), and Follow-up (From ER, blocked salivary glands. Suggested ENT. Unable to get in timely.).  History of Present Illness     Here today as above.    Has been doing fairly well though life has been rather stressful of late. Has a  at home as well as an almost 2-year-old. Both he and his wife have been struggling with postpartum depression. Feels overwhelmed regularly and close to tears. Feels irritable and annoyed by the slightest provocation. Frustrated as he feels that his coping skills are not working as well as previously. Has 2 sisters who have both done well with sertraline in the past, hoping that he might try the same today. Has been trying to exercise more regularly which has helped with his mental health.    Has a history of hyperlipidemia for which she is on atorvastatin at present. No need for repeat labs at this time. Working on diet and exercise.    Was seen in the emergency room with what sounds like sialoadenitis. Was recommended he follow-up with ENT though his symptoms have resolved completely.    Objective     Vital Signs:   BP (!) 160/102   Pulse 73   Resp 16   Ht 185.4 cm (73\")   Wt 127 kg (281 lb)   SpO2 96%   BMI 37.07 kg/m²   Physical Exam  Vitals and nursing note reviewed.   Constitutional:       General: He is not in acute distress.     Appearance: Normal appearance. He is not ill-appearing.   Cardiovascular:      Rate and Rhythm: Normal rate and regular rhythm.      Pulses: Normal pulses.      Heart sounds: Normal heart sounds. No murmur heard.  Pulmonary:      Effort: " Pulmonary effort is normal. No respiratory distress.      Breath sounds: Normal breath sounds. No rales.   Neurological:      Mental Status: He is alert and oriented to person, place, and time. Mental status is at baseline.   Psychiatric:         Mood and Affect: Mood normal.         Behavior: Behavior normal.                 Assessment and Plan   Diagnoses and all orders for this visit:    1. Adjustment reaction with anxiety and depression (Primary)  -     sertraline (ZOLOFT) 50 MG tablet; Take 1 tablet by mouth Daily for 180 days.  Dispense: 90 tablet; Refill: 1    2. Hypertriglyceridemia    3. Sialoadenitis    Will start some sertraline as above. Discussed anticipated effects and potential side effects. He will keep me updated with his progress. Discussed self titration as needed.    No further interventions indicated at this time. He will keep me updated with any further symptoms of sialoadenitis. Will plan on repeat triglycerides at follow-up.    Recommended follow up as below. Encouraged communication via "[employer]hart in the meantime.     Patient was given instructions and counseling regarding his condition or for health maintenance advice. Please see specific information pulled into the AVS (placed there by myself) if appropriate.    Return in about 6 months (around 11/15/2024), or if symptoms worsen or fail to improve.    SILVIA Carlson MD

## 2024-08-21 ENCOUNTER — OFFICE VISIT (OUTPATIENT)
Dept: FAMILY MEDICINE CLINIC | Facility: CLINIC | Age: 34
End: 2024-08-21
Payer: COMMERCIAL

## 2024-08-21 VITALS
OXYGEN SATURATION: 97 % | BODY MASS INDEX: 37.31 KG/M2 | SYSTOLIC BLOOD PRESSURE: 126 MMHG | HEIGHT: 73 IN | RESPIRATION RATE: 16 BRPM | WEIGHT: 281.5 LBS | DIASTOLIC BLOOD PRESSURE: 90 MMHG | HEART RATE: 67 BPM

## 2024-08-21 DIAGNOSIS — F43.23 ADJUSTMENT REACTION WITH ANXIETY AND DEPRESSION: Primary | ICD-10-CM

## 2024-08-21 PROCEDURE — 99213 OFFICE O/P EST LOW 20 MIN: CPT | Performed by: FAMILY MEDICINE

## 2024-08-21 NOTE — PROGRESS NOTES
"Chief Complaint  Depression, Anxiety, and Hyperlipidemia    Subjective    History of Present Illness    León Martinez presents to [facility] for Depression, Anxiety, and Hyperlipidemia.  History of Present Illness     Here today for follow-up as above. Has been doing well since our last visit. Taking low-dose sertraline with good overall results. Did try to titrate up higher though found that he did not like the side effects. Will continue with 25 for now. Wondering if he can taper off completely. Feels that life is more stable at present.    Has been working on diet and exercise, will be due for an annual exam in about 4 months. Would like to recheck labs at that time.    Objective     Vital Signs:   /90   Pulse 67   Resp 16   Ht 185.4 cm (73\")   Wt 128 kg (281 lb 8 oz)   SpO2 97%   BMI 37.14 kg/m²   Physical Exam  Vitals and nursing note reviewed.   Constitutional:       General: He is not in acute distress.     Appearance: Normal appearance. He is not ill-appearing.   Cardiovascular:      Rate and Rhythm: Normal rate and regular rhythm.      Pulses: Normal pulses.      Heart sounds: Normal heart sounds. No murmur heard.  Pulmonary:      Effort: Pulmonary effort is normal. No respiratory distress.      Breath sounds: Normal breath sounds. No rales.   Neurological:      Mental Status: He is alert and oriented to person, place, and time. Mental status is at baseline.   Psychiatric:         Mood and Affect: Mood normal.         Behavior: Behavior normal.                 Assessment and Plan   Diagnoses and all orders for this visit:    1. Adjustment reaction with anxiety and depression (Primary)    Continue sertraline for now if you would like. Also welcome to come off of it if he would like. He will keep me updated and we can adjust as needed.    Recommended follow up as below. Encouraged communication via [employer] in the meantime.     Patient was given instructions and " counseling regarding his condition or for health maintenance advice. Please see specific information pulled into the AVS (placed there by myself) if appropriate.    Return in about 4 months (around 12/21/2024), or if symptoms worsen or fail to improve.    SILVIA Carlson MD

## 2024-12-18 ENCOUNTER — OFFICE VISIT (OUTPATIENT)
Dept: FAMILY MEDICINE CLINIC | Facility: CLINIC | Age: 34
End: 2024-12-18
Payer: COMMERCIAL

## 2024-12-18 VITALS
SYSTOLIC BLOOD PRESSURE: 132 MMHG | DIASTOLIC BLOOD PRESSURE: 80 MMHG | BODY MASS INDEX: 39.08 KG/M2 | HEIGHT: 73 IN | OXYGEN SATURATION: 97 % | RESPIRATION RATE: 18 BRPM | HEART RATE: 76 BPM | WEIGHT: 294.9 LBS

## 2024-12-18 DIAGNOSIS — Z13.1 SCREENING FOR DIABETES MELLITUS: ICD-10-CM

## 2024-12-18 DIAGNOSIS — E66.812 CLASS 2 OBESITY DUE TO EXCESS CALORIES WITHOUT SERIOUS COMORBIDITY WITH BODY MASS INDEX (BMI) OF 38.0 TO 38.9 IN ADULT: ICD-10-CM

## 2024-12-18 DIAGNOSIS — Z00.00 ROUTINE HEALTH MAINTENANCE: Primary | ICD-10-CM

## 2024-12-18 DIAGNOSIS — E66.09 CLASS 2 OBESITY DUE TO EXCESS CALORIES WITHOUT SERIOUS COMORBIDITY WITH BODY MASS INDEX (BMI) OF 38.0 TO 38.9 IN ADULT: ICD-10-CM

## 2024-12-18 DIAGNOSIS — E03.9 ACQUIRED HYPOTHYROIDISM: ICD-10-CM

## 2024-12-18 DIAGNOSIS — E78.1 HYPERTRIGLYCERIDEMIA: ICD-10-CM

## 2024-12-18 LAB
ALBUMIN SERPL-MCNC: 4.8 G/DL (ref 3.5–5.2)
ALBUMIN/GLOB SERPL: 1.8 G/DL
ALP SERPL-CCNC: 42 U/L (ref 39–117)
ALT SERPL-CCNC: 28 U/L (ref 1–41)
AST SERPL-CCNC: 22 U/L (ref 1–40)
BILIRUB SERPL-MCNC: 0.6 MG/DL (ref 0–1.2)
BUN SERPL-MCNC: 14 MG/DL (ref 6–20)
BUN/CREAT SERPL: 13.7 (ref 7–25)
CALCIUM SERPL-MCNC: 9.4 MG/DL (ref 8.6–10.5)
CHLORIDE SERPL-SCNC: 102 MMOL/L (ref 98–107)
CHOLEST SERPL-MCNC: 156 MG/DL (ref 0–200)
CO2 SERPL-SCNC: 26.6 MMOL/L (ref 22–29)
CREAT SERPL-MCNC: 1.02 MG/DL (ref 0.76–1.27)
EGFRCR SERPLBLD CKD-EPI 2021: 98.9 ML/MIN/1.73
GLOBULIN SER CALC-MCNC: 2.6 GM/DL
GLUCOSE SERPL-MCNC: 91 MG/DL (ref 65–99)
HDLC SERPL-MCNC: 38 MG/DL (ref 40–60)
LDLC SERPL CALC-MCNC: 76 MG/DL (ref 0–100)
POTASSIUM SERPL-SCNC: 4.6 MMOL/L (ref 3.5–5.2)
PROT SERPL-MCNC: 7.4 G/DL (ref 6–8.5)
SODIUM SERPL-SCNC: 137 MMOL/L (ref 136–145)
TRIGL SERPL-MCNC: 257 MG/DL (ref 0–150)
TSH SERPL DL<=0.005 MIU/L-ACNC: 0.81 UIU/ML (ref 0.27–4.2)
VLDLC SERPL CALC-MCNC: 42 MG/DL (ref 5–40)

## 2024-12-18 PROCEDURE — 99395 PREV VISIT EST AGE 18-39: CPT | Performed by: FAMILY MEDICINE

## 2024-12-18 PROCEDURE — 99213 OFFICE O/P EST LOW 20 MIN: CPT | Performed by: FAMILY MEDICINE

## 2024-12-18 RX ORDER — LEVOTHYROXINE SODIUM 100 UG/1
100 TABLET ORAL DAILY
Qty: 90 TABLET | Refills: 3 | Status: SHIPPED | OUTPATIENT
Start: 2024-12-18

## 2024-12-18 RX ORDER — ATORVASTATIN CALCIUM 20 MG/1
20 TABLET, FILM COATED ORAL DAILY
Qty: 90 TABLET | Refills: 3 | Status: SHIPPED | OUTPATIENT
Start: 2024-12-18

## 2024-12-18 NOTE — PATIENT INSTRUCTIONS
Mindfulness apps: Headspace, Smiling Mind, [place]!    Mindfulness-Based Stress Reduction  Mindfulness-based stress reduction (MBSR) is a program that helps people learn to practice mindfulness. Mindfulness is the practice of intentionally paying attention to the present moment. It can be learned and practiced through techniques such as education, breathing exercises, meditation, and yoga. MBSR includes several mindfulness techniques in one program.  MBSR works best when you understand the treatment, are willing to try new things, and can commit to spending time practicing what you learn. MBSR training may include learning about:  How your emotions, thoughts, and reactions affect your body.  New ways to respond to things that cause negative thoughts to start (triggers).  How to notice your thoughts and let go of them.  Practicing awareness of everyday things that you normally do without thinking.  The techniques and goals of different types of meditation.  What are the benefits of MBSR?  MBSR can have many benefits, which include helping you to:  Develop self-awareness. This refers to knowing and understanding yourself.  Learn skills and attitudes that help you to participate in your own health care.  Learn new ways to care for yourself.  Be more accepting about how things are, and let things go.  Be less judgmental and approach things with an open mind.  Be patient with yourself and trust yourself more.  MBSR has also been shown to:  Reduce negative emotions, such as depression and anxiety.  Improve memory and focus.  Change how you sense and approach pain.  Boost your body's ability to fight infections.  Help you connect better with other people.  Improve your sense of well-being.  Follow these instructions at home:    Find a local in-person or online MBSR program.  Set aside some time regularly for mindfulness practice.  Find a mindfulness practice that works best for you. This may include one or more of the  following:  Meditation. Meditation involves focusing your mind on a certain thought or activity.  Breathing awareness exercises. These help you to stay present by focusing on your breath.  Body scan. For this practice, you lie down and pay attention to each part of your body from head to toe. You can identify tension and soreness and intentionally relax parts of your body.  Yoga. Yoga involves stretching and breathing, and it can improve your ability to move and be flexible. It can also provide an experience of testing your body's limits, which can help you release stress.  Mindful eating. This way of eating involves focusing on the taste, texture, color, and smell of each bite of food. Because this slows down eating and helps you feel full sooner, it can be an important part of a weight-loss plan.  Find a podcast or recording that provides guidance for breathing awareness, body scan, or meditation exercises. You can listen to these any time when you have a free moment to rest without distractions.  Follow your treatment plan as told by your health care provider. This may include taking regular medicines and making changes to your diet or lifestyle as recommended.  How to practice mindfulness  To do a basic awareness exercise:  Find a comfortable place to sit.  Pay attention to the present moment. Observe your thoughts, feelings, and surroundings just as they are.  Avoid placing judgment on yourself, your feelings, or your surroundings. Make note of any judgment that comes up, and let it go.  Your mind may wander, and that is okay. Make note of when your thoughts drift, and return your attention to the present moment.  To do basic mindfulness meditation:  Find a comfortable place to sit. This may include a stable chair or a firm floor cushion.  Sit upright with your back straight. Let your arms fall next to your side with your hands resting on your legs.  If sitting in a chair, rest your feet flat on the floor.  If  sitting on a cushion, cross your legs in front of you.  Keep your head in a neutral position with your chin dropped slightly. Relax your jaw and rest the tip of your tongue on the roof of your mouth. Drop your gaze to the floor. You can close your eyes if you like.  Breathe normally and pay attention to your breath. Feel the air moving in and out of your nose. Feel your belly expanding and relaxing with each breath.  Your mind may wander, and that is okay. Make note of when your thoughts drift, and return your attention to your breath.  Avoid placing judgment on yourself, your feelings, or your surroundings. Make note of any judgment or feelings that come up, let them go, and bring your attention back to your breath.  When you are ready, lift your gaze or open your eyes. Pay attention to how your body feels after the meditation.  Where to find more information  You can find more information about MBSR from:  Your health care provider.  Community-based meditation centers or programs.  Programs offered near you.  Summary  Mindfulness-based stress reduction (MBSR) is a program that teaches you how to intentionally pay attention to the present moment. It is used with other treatments to help you cope better with daily stress, emotions, and pain.  MBSR focuses on developing self-awareness, which allows you to respond to life stress without judgment or negative emotions.  MBSR programs may involve learning different mindfulness practices, such as breathing exercises, meditation, yoga, body scan, or mindful eating. Find a mindfulness practice that works best for you, and set aside time for it on a regular basis.  This information is not intended to replace advice given to you by your health care provider. Make sure you discuss any questions you have with your health care provider.  Document Released: 04/26/2018 Document Revised: 11/30/2018 Document Reviewed: 04/26/2018  Elsevier Patient Education © 2020 [employer]

## 2024-12-18 NOTE — PROGRESS NOTES
"Chief Complaint  Annual Exam    Subjective    History of Present Illness    Here today for annual exam and to discuss preventive health maintenance.    Doing fairly well overall. Due for some routine blood work. Has a history of hypertriglyceridemia, taking atorvastatin 20 mg as prescribed. Needs a refill today.    Also has longstanding hypothyroidism for which she is on levothyroxine 100 mcg. Needs a refill today. Reports good adherence and tolerance, no unwanted side effects. No current signs or symptoms of high or low thyroid function.    Weight is up somewhat since last visit, plans to work more on diet and exercise in the new year. Has some questions about trying a keto diet.    Has good family and social support. Work is going well and stress is reasonably well-managed.    Objective     Vital Signs:   /80   Pulse 76   Resp 18   Ht 185.4 cm (73\")   Wt 134 kg (294 lb 14.4 oz)   SpO2 97%   BMI 38.91 kg/m²   Physical Exam  Vitals and nursing note reviewed.   Constitutional:       General: He is not in acute distress.     Appearance: Normal appearance. He is not ill-appearing.   Cardiovascular:      Rate and Rhythm: Normal rate and regular rhythm.      Pulses: Normal pulses.      Heart sounds: Normal heart sounds. No murmur heard.  Pulmonary:      Effort: Pulmonary effort is normal. No respiratory distress.      Breath sounds: Normal breath sounds. No rales.   Neurological:      Mental Status: He is alert and oriented to person, place, and time. Mental status is at baseline.   Psychiatric:         Mood and Affect: Mood normal.         Behavior: Behavior normal.                     Assessment and Plan   Diagnoses and all orders for this visit:    1. Routine health maintenance (Primary)    2. Hypertriglyceridemia  -     Comprehensive Metabolic Panel  -     Lipid Panel  -     atorvastatin (LIPITOR) 20 MG tablet; Take 1 tablet by mouth Daily.  Dispense: 90 tablet; Refill: 3    3. Acquired hypothyroidism  -  "    levothyroxine (SYNTHROID, LEVOTHROID) 100 MCG tablet; Take 1 tablet by mouth Daily.  Dispense: 90 tablet; Refill: 3  -     TSH Rfx On Abnormal To Free T4    4. Screening for diabetes mellitus  -     Comprehensive Metabolic Panel    5. Class 2 obesity due to excess calories without serious comorbidity with body mass index (BMI) of 38.0 to 38.9 in adult    Orders as above. I will contact him with results as available. Continue regimen as prescribed. Refills as requested.    Class 2 Severe Obesity (BMI >=35 and <=39.9). Obesity-related health conditions include the following: dyslipidemias. Obesity is worsening. BMI is is above average; BMI management plan is completed. We discussed portion control and increasing exercise.    Encouraged to continue working on healthy lifestyle habits. Recommended follow up as below. Encouraged communication via [employer] in the meantime.     Patient was given instructions and counseling regarding his condition or for health maintenance advice. Please see specific information pulled into the AVS (placed there by myself) if appropriate.    Return in about 1 year (around 12/18/2025), or if symptoms worsen or fail to improve, for Preventive Health Maintenance.    SILVIA Carlson MD    Prevention counseling performed as below: Mindfulness for stress management.

## 2025-01-06 DIAGNOSIS — E03.9 ACQUIRED HYPOTHYROIDISM: ICD-10-CM

## 2025-01-06 DIAGNOSIS — E78.1 HYPERTRIGLYCERIDEMIA: ICD-10-CM

## 2025-01-07 RX ORDER — LEVOTHYROXINE SODIUM 100 UG/1
100 TABLET ORAL DAILY
Qty: 90 TABLET | Refills: 3 | Status: SHIPPED | OUTPATIENT
Start: 2025-01-07

## 2025-01-07 RX ORDER — ATORVASTATIN CALCIUM 20 MG/1
20 TABLET, FILM COATED ORAL DAILY
Qty: 90 TABLET | Refills: 3 | Status: SHIPPED | OUTPATIENT
Start: 2025-01-07

## 2025-01-09 RX ORDER — POLYMYXIN B SULFATE AND TRIMETHOPRIM 1; 10000 MG/ML; [USP'U]/ML
1 SOLUTION OPHTHALMIC EVERY 4 HOURS
Qty: 10 ML | Refills: 0 | Status: SHIPPED | OUTPATIENT
Start: 2025-01-09

## 2025-02-15 DIAGNOSIS — F43.23 ADJUSTMENT REACTION WITH ANXIETY AND DEPRESSION: ICD-10-CM

## 2025-04-02 ENCOUNTER — OFFICE VISIT (OUTPATIENT)
Dept: SLEEP MEDICINE | Facility: HOSPITAL | Age: 35
End: 2025-04-02
Payer: COMMERCIAL

## 2025-04-02 VITALS — HEIGHT: 73 IN | BODY MASS INDEX: 36.98 KG/M2 | HEART RATE: 82 BPM | WEIGHT: 279 LBS | OXYGEN SATURATION: 97 %

## 2025-04-02 DIAGNOSIS — G47.33 SEVERE OBSTRUCTIVE SLEEP APNEA: Primary | ICD-10-CM

## 2025-04-02 DIAGNOSIS — E66.9 OBESITY (BMI 30-39.9): ICD-10-CM

## 2025-04-02 PROCEDURE — 99213 OFFICE O/P EST LOW 20 MIN: CPT | Performed by: FAMILY MEDICINE

## 2025-04-02 PROCEDURE — G0463 HOSPITAL OUTPT CLINIC VISIT: HCPCS

## 2025-04-02 NOTE — PROGRESS NOTES
"Follow Up [facility] Disorders [facility] Note     Chief Complaint:  RODOLFO     Primary Care Physician: Timothy Carlson MD    Interval History:   The patient is a 34 y.o. male  who was last seen in the sleep lab: 3/27/2024.  Presents today for annual follow-up on RODOLFO.  2022 study showed AHI 68.9.  On auto CPAP.  No problems with mask or machine.  40 pound weight loss since sleep study.  Working on further weight loss with keto diet.  Dentogen to Mediterranean diet this summer.    Downloaded PAP Data Reviewed For Compliance:  DME is [facility].  Downloads between 3/1/2025 - 3/30/2025.  Average usage is 7 hours 19 minutes.  Average AHI is 1.3.  Average auto CPAP pressure is 12.2 cm H2O    I have reviewed the above results and compared them with the patient's last downloads and reviewed with the patient.    Review of Systems:    A complete review of systems was done and all were negative with the exception of see scanned media    Social History:    Social History     Socioeconomic History    Marital status:    Tobacco Use    Smoking status: Never     Passive exposure: Never    Smokeless tobacco: Never   Vaping Use    Vaping status: Never Used   Substance and Sexual Activity    Alcohol use: Yes     Comment: less than 10 per week    Drug use: Never    Sexual activity: Defer     Partners: Female     Comment: wife       Allergies:  Tylenol [acetaminophen] and Naproxen sodium     Medication Review:  Reviewed.      Vital Signs:    Vitals:    04/02/25 0858   Pulse: 82   SpO2: 97%   Weight: 127 kg (279 lb)   Height: 185.4 cm (72.99\")     Body mass index is 36.82 kg/m².    Physical Exam:    Constitutional:  Well developed 34 y.o. male that appears in no apparent distress.  Awake & oriented times 3.  Normal mood with normal recent and remote memory and normal judgement.  Eyes:  Conjunctivae normal.  Oropharynx: Previously, moist mucous membranes.    Self-administered [place] Sleepiness Scale test results: See scanned media  0-5 " Lower normal daytime sleepiness  6-10 Higher normal daytime sleepiness  11-12 Mild, 13-15 Moderate, & 16-24 Severe excessive daytime sleepiness    Impression:   1. Severe obstructive sleep apnea    2. Obesity (BMI 30-39.9)        Obstructive sleep apnea adequately treated with auto CPAP 8-20 cm H2O. The patient appears to be at goal with good compliance and usage.  Patient will contact the office disarm when ready for repeat HST to see if severity of RODOLFO has improved to mild to low moderate to consider dental retainer or has resolved completely.    Plan:  Good sleep hygiene measures should be maintained.  Weight loss would be beneficial in this patient who is obese by Body mass index is 36.82 kg/m²..      After evaluating the patient and assessing results available, the patient is benefiting from the treatment being provided.     The patient will continue CPAP.  After clinical evaluation and review of downloads, I recommend no changes to the patient's pressures.  A new prescription will be sent to the patient's DME.    Caution during activities that require prolonged concentration is strongly advised if sleepiness returns. Changing of PAP supplies regularly is important for effective use. Patient needs to change cushion on the mask or plugs on nasal pillows along with disposable filters once every month and change mask frame, tubing, headgear and Velcro straps every 6 months at the minimum.    I answered all of the patient's questions.  The patient will call for any problems and will follow up in 1 year.      Baron Cuevas MD  Sleep Medicine  04/02/25  09:35 EDT

## 2025-04-14 ENCOUNTER — OFFICE VISIT (OUTPATIENT)
Dept: FAMILY MEDICINE CLINIC | Facility: CLINIC | Age: 35
End: 2025-04-14
Payer: COMMERCIAL

## 2025-04-14 VITALS
HEART RATE: 72 BPM | BODY MASS INDEX: 36.58 KG/M2 | OXYGEN SATURATION: 94 % | HEIGHT: 73 IN | DIASTOLIC BLOOD PRESSURE: 84 MMHG | SYSTOLIC BLOOD PRESSURE: 118 MMHG | WEIGHT: 276 LBS

## 2025-04-14 DIAGNOSIS — E78.1 HYPERTRIGLYCERIDEMIA: Primary | ICD-10-CM

## 2025-04-14 DIAGNOSIS — J45.990 MILD EXERCISE-INDUCED ASTHMA: ICD-10-CM

## 2025-04-14 PROCEDURE — 99214 OFFICE O/P EST MOD 30 MIN: CPT | Performed by: FAMILY MEDICINE

## 2025-04-14 RX ORDER — KETOCONAZOLE 20 MG/ML
SHAMPOO, SUSPENSION TOPICAL
COMMUNITY
Start: 2025-02-12

## 2025-04-14 RX ORDER — ALBUTEROL SULFATE 90 UG/1
2 INHALANT RESPIRATORY (INHALATION) EVERY 4 HOURS PRN
Qty: 18 G | Refills: 1 | Status: SHIPPED | OUTPATIENT
Start: 2025-04-14

## 2025-04-14 RX ORDER — TRIAMCINOLONE ACETONIDE 1 MG/G
CREAM TOPICAL
COMMUNITY
Start: 2025-02-12

## 2025-04-14 RX ORDER — FLUOCINOLONE ACETONIDE 0.11 MG/ML
OIL TOPICAL
COMMUNITY
Start: 2025-02-12

## 2025-04-14 NOTE — PROGRESS NOTES
"Chief Complaint  Hyperlipidemia    Subjective    History of Present Illness  History of Present Illness  The patient presents for evaluation of hyperlipidemia, hypertension, and asthma.    He has been adhering to a ketogenic diet, which has resulted in a weight loss of 23 pounds. He plans to transition to a Mediterranean diet. He reports that her blood pressure has significantly decreased. He is currently on atorvastatin, which he tolerates well without any adverse effects.    He is scheduled for wisdom teeth extraction in May 2025. He has been advised to use his albuterol inhaler preoperatively to ensure optimal lung function during anesthesia. He has not used her albuterol inhaler for the past 2 years. He has exercise-induced asthma, would like a refill today.      Objective     Vital Signs:   /84   Pulse 72   Ht 185.4 cm (72.99\")   Wt 125 kg (276 lb)   SpO2 94%   BMI 36.42 kg/m²   Physical Exam  Vitals and nursing note reviewed.   Constitutional:       General: He is not in acute distress.     Appearance: Normal appearance. He is not ill-appearing.   Cardiovascular:      Rate and Rhythm: Normal rate and regular rhythm.      Pulses: Normal pulses.      Heart sounds: Normal heart sounds. No murmur heard.  Pulmonary:      Effort: Pulmonary effort is normal. No respiratory distress.      Breath sounds: Normal breath sounds. No rales.   Neurological:      Mental Status: He is alert and oriented to person, place, and time. Mental status is at baseline.   Psychiatric:         Mood and Affect: Mood normal.         Behavior: Behavior normal.          Assessment and Plan   Diagnoses and all orders for this visit:    1. Hypertriglyceridemia (Primary)  -     Lipid Panel  -     Comprehensive Metabolic Panel    2. Mild exercise-induced asthma  -     albuterol sulfate  (90 Base) MCG/ACT inhaler; Inhale 2 puffs Every 4 (Four) Hours As Needed for Wheezing.  Dispense: 18 g; Refill: 1      Assessment & Plan  1. " Hyperlipidemia.  His cholesterol levels are expected to continue decreasing. A lipid panel will be conducted today to assess his current status.    2. Asthma.  A prescription for albuterol will be provided.    Recommended follow up as below. Encouraged communication via MyChart in the meantime.     Patient was given instructions and counseling regarding his condition or for health maintenance advice. Please see specific information pulled into the AVS (placed there by myself) if appropriate.    Return if symptoms worsen or fail to improve.    Patient or patient representative verbalized consent for the use of Ambient Listening during the visit with  Timothy Carlson MD for chart documentation. 4/14/2025  11:56 EDT    SILVIA Carlson MD

## 2025-04-16 LAB
ALBUMIN SERPL-MCNC: 4.6 G/DL (ref 3.5–5.2)
ALBUMIN/GLOB SERPL: 1.8 G/DL
ALP SERPL-CCNC: 46 U/L (ref 39–117)
ALT SERPL-CCNC: 22 U/L (ref 1–41)
AST SERPL-CCNC: 19 U/L (ref 1–40)
BILIRUB SERPL-MCNC: 0.9 MG/DL (ref 0–1.2)
BUN SERPL-MCNC: 14 MG/DL (ref 6–20)
BUN/CREAT SERPL: 13 (ref 7–25)
CALCIUM SERPL-MCNC: 9.6 MG/DL (ref 8.6–10.5)
CHLORIDE SERPL-SCNC: 101 MMOL/L (ref 98–107)
CHOLEST SERPL-MCNC: 140 MG/DL (ref 0–200)
CO2 SERPL-SCNC: 26.5 MMOL/L (ref 22–29)
CREAT SERPL-MCNC: 1.08 MG/DL (ref 0.76–1.27)
EGFRCR SERPLBLD CKD-EPI 2021: 92.3 ML/MIN/1.73
GLOBULIN SER CALC-MCNC: 2.6 GM/DL
GLUCOSE SERPL-MCNC: 85 MG/DL (ref 65–99)
HDLC SERPL-MCNC: 39 MG/DL (ref 40–60)
LDLC SERPL CALC-MCNC: 63 MG/DL (ref 0–100)
POTASSIUM SERPL-SCNC: 4.5 MMOL/L (ref 3.5–5.2)
PROT SERPL-MCNC: 7.2 G/DL (ref 6–8.5)
SODIUM SERPL-SCNC: 139 MMOL/L (ref 136–145)
TRIGL SERPL-MCNC: 232 MG/DL (ref 0–150)
VLDLC SERPL CALC-MCNC: 38 MG/DL (ref 5–40)

## 2025-06-30 NOTE — TELEPHONE ENCOUNTER
Last OV: 9/9/22    Last Refill: 5/11/22   will help achieve a quicker response. Those who have few allergies may respond faster than those who have many allergies. Do not stop the injections on your own; if concerns arise please contact our office to discuss them.  6. What to do if my son/daughter is going away to college and they are on allergy injections?  Allergy injections are given at most colleges. Frequently, the student health department will give allergy injections. We will send the allergy extract on to them with complete instructions. If the institution has any questions, they are instructed to contact us before giving any allergy injection. We will then see the student for regular checkups during school breaks when they are home.  7. What do I do if I'm moving?  If you are moving, you will need to contact an allergist local to the area in which you are moving. We will need their contact information to transfer your care. We will mail allergy extract to your new provider.  8. Can I, or a friend, give myself injections at home?  No. This is a treatment with serious implications. Should you have a reaction you would not be able to give yourself CPR or drive yourself to an emergency room or even call for help. Injections are only given in a facility with a medical provider available.  9. How do I reorder antigen when it is all gone or ?  If you receive your injections in our office, we will reorder as necessary. If you are receiving your injections at another office, see the section For Patients Receiving Allergy Shots Outside Our Office.  10. What if I am, or want to become, pregnant?  Notify us immediately, if you are or think you are pregnant. During the course of your pregnancy we prefer you stop injections..  11. Can I get a shot if I have a rash?  It may depend on the rash and location of the rash. If there is a new rash on the arms we won't be able to give you an injection. If this is a chronic condition and hasn't changed or doesn't